# Patient Record
Sex: FEMALE | Race: WHITE | NOT HISPANIC OR LATINO | Employment: UNEMPLOYED | URBAN - METROPOLITAN AREA
[De-identification: names, ages, dates, MRNs, and addresses within clinical notes are randomized per-mention and may not be internally consistent; named-entity substitution may affect disease eponyms.]

---

## 2020-10-29 ENCOUNTER — HOSPITAL ENCOUNTER (INPATIENT)
Facility: HOSPITAL | Age: 30
LOS: 2 days | Discharge: HOME | DRG: 442 | End: 2020-10-31
Attending: EMERGENCY MEDICINE | Admitting: INTERNAL MEDICINE
Payer: MEDICAID

## 2020-10-29 DIAGNOSIS — R10.84 GENERALIZED ABDOMINAL PAIN: ICD-10-CM

## 2020-10-29 DIAGNOSIS — F19.10 POLYSUBSTANCE ABUSE: ICD-10-CM

## 2020-10-29 DIAGNOSIS — R17 JAUNDICE: ICD-10-CM

## 2020-10-29 DIAGNOSIS — B17.9 ACUTE HEPATITIS: Primary | ICD-10-CM

## 2020-10-29 PROBLEM — F17.200 SMOKER: Status: ACTIVE | Noted: 2020-10-29

## 2020-10-29 PROBLEM — R74.8 ELEVATED LIVER ENZYMES: Status: ACTIVE | Noted: 2020-10-29

## 2020-10-29 LAB
ALBUMIN SERPL BCP-MCNC: 2.8 G/DL (ref 3.5–5.2)
ALP SERPL-CCNC: 172 U/L (ref 55–135)
ALT SERPL W/O P-5'-P-CCNC: 1974 U/L (ref 10–44)
AMPHET+METHAMPHET UR QL: NORMAL
AMYLASE SERPL-CCNC: 47 U/L (ref 20–110)
ANION GAP SERPL CALC-SCNC: 5 MMOL/L (ref 8–16)
APAP SERPL-MCNC: <3 UG/ML (ref 10–20)
APTT BLDCRRT: 40.6 SEC (ref 21–32)
AST SERPL-CCNC: 2965 U/L (ref 10–40)
B-HCG UR QL: NEGATIVE
BARBITURATES UR QL SCN>200 NG/ML: NEGATIVE
BASOPHILS # BLD AUTO: 0.01 K/UL (ref 0–0.2)
BASOPHILS NFR BLD: 0.2 % (ref 0–1.9)
BENZODIAZ UR QL SCN>200 NG/ML: NEGATIVE
BILIRUB SERPL-MCNC: 7.6 MG/DL (ref 0.1–1)
BILIRUB UR QL STRIP: ABNORMAL
BUN SERPL-MCNC: 12 MG/DL (ref 6–20)
BZE UR QL SCN: NEGATIVE
CALCIUM SERPL-MCNC: 7.7 MG/DL (ref 8.7–10.5)
CANNABINOIDS UR QL SCN: NORMAL
CHLORIDE SERPL-SCNC: 106 MMOL/L (ref 95–110)
CLARITY UR: CLEAR
CO2 SERPL-SCNC: 22 MMOL/L (ref 23–29)
COLOR UR: YELLOW
CREAT SERPL-MCNC: 0.7 MG/DL (ref 0.5–1.4)
CREAT UR-MCNC: 171.3 MG/DL (ref 15–325)
DIFFERENTIAL METHOD: ABNORMAL
EOSINOPHIL # BLD AUTO: 0 K/UL (ref 0–0.5)
EOSINOPHIL NFR BLD: 0.2 % (ref 0–8)
ERYTHROCYTE [DISTWIDTH] IN BLOOD BY AUTOMATED COUNT: 17.7 % (ref 11.5–14.5)
EST. GFR  (AFRICAN AMERICAN): >60 ML/MIN/1.73 M^2
EST. GFR  (NON AFRICAN AMERICAN): >60 ML/MIN/1.73 M^2
GLUCOSE SERPL-MCNC: 91 MG/DL (ref 70–110)
GLUCOSE UR QL STRIP: NEGATIVE
HCT VFR BLD AUTO: 38 % (ref 37–48.5)
HGB BLD-MCNC: 11.9 G/DL (ref 12–16)
HGB UR QL STRIP: NEGATIVE
HIV 1+2 AB+HIV1 P24 AG SERPL QL IA: NEGATIVE
IMM GRANULOCYTES # BLD AUTO: 0.03 K/UL (ref 0–0.04)
IMM GRANULOCYTES NFR BLD AUTO: 0.6 % (ref 0–0.5)
INR PPP: 1.4 (ref 0.8–1.2)
INR PPP: 1.4 (ref 0.8–1.2)
KETONES UR QL STRIP: NEGATIVE
LEUKOCYTE ESTERASE UR QL STRIP: ABNORMAL
LIPASE SERPL-CCNC: 38 U/L (ref 4–60)
LYMPHOCYTES # BLD AUTO: 1.7 K/UL (ref 1–4.8)
LYMPHOCYTES NFR BLD: 31.5 % (ref 18–48)
MCH RBC QN AUTO: 24.7 PG (ref 27–31)
MCHC RBC AUTO-ENTMCNC: 31.3 G/DL (ref 32–36)
MCV RBC AUTO: 79 FL (ref 82–98)
METHADONE UR QL SCN>300 NG/ML: NEGATIVE
MICROSCOPIC COMMENT: ABNORMAL
MONOCYTES # BLD AUTO: 0.6 K/UL (ref 0.3–1)
MONOCYTES NFR BLD: 10.4 % (ref 4–15)
NEUTROPHILS # BLD AUTO: 3 K/UL (ref 1.8–7.7)
NEUTROPHILS NFR BLD: 57.1 % (ref 38–73)
NITRITE UR QL STRIP: NEGATIVE
NRBC BLD-RTO: 0 /100 WBC
OPIATES UR QL SCN: NEGATIVE
PCP UR QL SCN>25 NG/ML: NEGATIVE
PH UR STRIP: 6 [PH] (ref 5–8)
PLATELET # BLD AUTO: 174 K/UL (ref 150–350)
PLATELET BLD QL SMEAR: ABNORMAL
PMV BLD AUTO: ABNORMAL FL (ref 9.2–12.9)
POTASSIUM SERPL-SCNC: 4 MMOL/L (ref 3.5–5.1)
PROT SERPL-MCNC: 6.4 G/DL (ref 6–8.4)
PROT UR QL STRIP: ABNORMAL
PROTHROMBIN TIME: 15 SEC (ref 9–12.5)
PROTHROMBIN TIME: 15.1 SEC (ref 9–12.5)
RBC # BLD AUTO: 4.82 M/UL (ref 4–5.4)
RBC #/AREA URNS HPF: 11 /HPF (ref 0–4)
SARS-COV-2 RDRP RESP QL NAA+PROBE: NEGATIVE
SODIUM SERPL-SCNC: 133 MMOL/L (ref 136–145)
SP GR UR STRIP: >=1.03 (ref 1–1.03)
TOXICOLOGY INFORMATION: NORMAL
URN SPEC COLLECT METH UR: ABNORMAL
UROBILINOGEN UR STRIP-ACNC: NEGATIVE EU/DL
WBC # BLD AUTO: 5.27 K/UL (ref 3.9–12.7)
WBC #/AREA URNS HPF: 7 /HPF (ref 0–5)

## 2020-10-29 PROCEDURE — 63600175 PHARM REV CODE 636 W HCPCS: Performed by: REGISTERED NURSE

## 2020-10-29 PROCEDURE — 36415 COLL VENOUS BLD VENIPUNCTURE: CPT

## 2020-10-29 PROCEDURE — 87040 BLOOD CULTURE FOR BACTERIA: CPT | Mod: 59

## 2020-10-29 PROCEDURE — 25500020 PHARM REV CODE 255: Performed by: EMERGENCY MEDICINE

## 2020-10-29 PROCEDURE — G0378 HOSPITAL OBSERVATION PER HR: HCPCS

## 2020-10-29 PROCEDURE — 85610 PROTHROMBIN TIME: CPT

## 2020-10-29 PROCEDURE — 83690 ASSAY OF LIPASE: CPT

## 2020-10-29 PROCEDURE — 80053 COMPREHEN METABOLIC PANEL: CPT

## 2020-10-29 PROCEDURE — 96374 THER/PROPH/DIAG INJ IV PUSH: CPT

## 2020-10-29 PROCEDURE — 25000003 PHARM REV CODE 250: Performed by: REGISTERED NURSE

## 2020-10-29 PROCEDURE — 80329 ANALGESICS NON-OPIOID 1 OR 2: CPT

## 2020-10-29 PROCEDURE — 80307 DRUG TEST PRSMV CHEM ANLYZR: CPT

## 2020-10-29 PROCEDURE — 11000001 HC ACUTE MED/SURG PRIVATE ROOM

## 2020-10-29 PROCEDURE — 85610 PROTHROMBIN TIME: CPT | Mod: 91

## 2020-10-29 PROCEDURE — U0002 COVID-19 LAB TEST NON-CDC: HCPCS

## 2020-10-29 PROCEDURE — 99285 EMERGENCY DEPT VISIT HI MDM: CPT | Mod: 25

## 2020-10-29 PROCEDURE — 82150 ASSAY OF AMYLASE: CPT

## 2020-10-29 PROCEDURE — 81000 URINALYSIS NONAUTO W/SCOPE: CPT | Mod: 59

## 2020-10-29 PROCEDURE — 96361 HYDRATE IV INFUSION ADD-ON: CPT

## 2020-10-29 PROCEDURE — 85730 THROMBOPLASTIN TIME PARTIAL: CPT

## 2020-10-29 PROCEDURE — 85025 COMPLETE CBC W/AUTO DIFF WBC: CPT

## 2020-10-29 PROCEDURE — 81025 URINE PREGNANCY TEST: CPT

## 2020-10-29 PROCEDURE — 80074 ACUTE HEPATITIS PANEL: CPT

## 2020-10-29 PROCEDURE — 86703 HIV-1/HIV-2 1 RESULT ANTBDY: CPT

## 2020-10-29 RX ORDER — ONDANSETRON 2 MG/ML
4 INJECTION INTRAMUSCULAR; INTRAVENOUS EVERY 6 HOURS PRN
Status: DISCONTINUED | OUTPATIENT
Start: 2020-10-29 | End: 2020-10-31 | Stop reason: HOSPADM

## 2020-10-29 RX ORDER — ACETAMINOPHEN 325 MG/1
650 TABLET ORAL EVERY 6 HOURS PRN
Status: DISCONTINUED | OUTPATIENT
Start: 2020-10-29 | End: 2020-10-30

## 2020-10-29 RX ORDER — ONDANSETRON 2 MG/ML
4 INJECTION INTRAMUSCULAR; INTRAVENOUS
Status: COMPLETED | OUTPATIENT
Start: 2020-10-29 | End: 2020-10-29

## 2020-10-29 RX ADMIN — ONDANSETRON 4 MG: 2 INJECTION INTRAMUSCULAR; INTRAVENOUS at 01:10

## 2020-10-29 RX ADMIN — SODIUM CHLORIDE 1000 ML: 0.9 INJECTION, SOLUTION INTRAVENOUS at 01:10

## 2020-10-29 RX ADMIN — IOHEXOL 75 ML: 350 INJECTION, SOLUTION INTRAVENOUS at 04:10

## 2020-10-29 NOTE — PLAN OF CARE
Pt is resting in bed, remains free of falls and injuries. Pt refused BLANCA hose and SCDs. VSS. No obvious s/sx of distress noted. POC reviewed with the patient, verbalized understanding. Will continue to monitor.

## 2020-10-29 NOTE — SUBJECTIVE & OBJECTIVE
"History reviewed. No pertinent past medical history.    History reviewed. No pertinent surgical history.    Review of patient's allergies indicates:  No Known Allergies    No current facility-administered medications on file prior to encounter.      No current outpatient medications on file prior to encounter.     Family History     Problem Relation (Age of Onset)    Cancer Maternal Grandmother, Maternal Grandfather    Diabetes Mother, Father        Tobacco Use    Smoking status: Current Every Day Smoker     Packs/day: 0.50     Years: 5.00     Pack years: 2.50     Types: Cigarettes    Smokeless tobacco: Current User   Substance and Sexual Activity    Alcohol use: Not Currently    Drug use: Yes     Types: IV, Methamphetamines     Comment: Reports crystal meth use today    Sexual activity: Yes     Review of Systems   Constitutional: Negative for chills, diaphoresis and fever.   HENT: Negative for congestion, hearing loss, nosebleeds and postnasal drip.    Eyes: Negative for photophobia, pain, redness and visual disturbance.   Respiratory: Negative for apnea, choking and chest tightness.    Cardiovascular: Negative for chest pain, palpitations and leg swelling.   Gastrointestinal: Positive for abdominal pain. Negative for blood in stool, constipation, diarrhea, nausea and vomiting.        Reports "Oily stools"   Genitourinary: Negative for flank pain.   Musculoskeletal: Negative for back pain, gait problem, joint swelling, neck pain and neck stiffness.   Skin: Negative for pallor, rash and wound.   Neurological: Negative for dizziness, tremors, syncope, facial asymmetry and headaches.   Hematological: Negative for adenopathy.   Psychiatric/Behavioral: Negative for agitation, behavioral problems and confusion.     Objective:     Vital Signs (Most Recent):  Temp: 97.3 °F (36.3 °C) (10/29/20 1226)  Pulse: 82 (10/29/20 1620)  Resp: 18 (10/29/20 1620)  BP: 113/71 (10/29/20 1620)  SpO2: 100 % (10/29/20 1620) Vital Signs " (24h Range):  Temp:  [97.3 °F (36.3 °C)] 97.3 °F (36.3 °C)  Pulse:  [] 82  Resp:  [16-18] 18  SpO2:  [100 %] 100 %  BP: (113-134)/(69-87) 113/71     Weight: 67.3 kg (148 lb 4.2 oz)  Body mass index is 27.12 kg/m².    Physical Exam  Constitutional:       Appearance: Normal appearance.   HENT:      Head: Normocephalic and atraumatic.      Nose: No congestion or rhinorrhea.      Mouth/Throat:      Pharynx: No posterior oropharyngeal erythema.   Eyes:      General: Scleral icterus present.      Extraocular Movements: Extraocular movements intact.      Pupils: Pupils are equal, round, and reactive to light.   Neck:      Musculoskeletal: Normal range of motion and neck supple. No neck rigidity.      Vascular: No carotid bruit.   Cardiovascular:      Rate and Rhythm: Normal rate and regular rhythm.   Pulmonary:      Effort: Pulmonary effort is normal. No respiratory distress.      Breath sounds: Normal breath sounds. No stridor. No wheezing.   Abdominal:      General: There is no distension.      Palpations: Abdomen is soft.      Tenderness: There is abdominal tenderness. There is no guarding.   Musculoskeletal: Normal range of motion.         General: No swelling or deformity.   Skin:     General: Skin is warm and dry.      Capillary Refill: Capillary refill takes less than 2 seconds.      Coloration: Skin is not jaundiced.      Findings: No erythema or rash.   Neurological:      Mental Status: She is alert and oriented to person, place, and time.      Motor: No weakness.   Psychiatric:         Mood and Affect: Mood normal.         Behavior: Behavior normal.         Thought Content: Thought content normal.         Judgment: Judgment normal.           CRANIAL NERVES     CN III, IV, VI   Pupils are equal, round, and reactive to light.       Significant Labs: All pertinent labs within the past 24 hours have been reviewed.    Significant Imaging: I have reviewed all pertinent imaging results/findings within the past 24  hours.

## 2020-10-29 NOTE — ED PROVIDER NOTES
"10/29/2020, 12:27 PM   History obtained from the patient      History of Present Illness: Milagros Swan is a 30 y.o. female patient who presents to the Emergency Department for RUQ abd pain and jaundice which onset gradually 3 days ago.    12:27 PM: Pt was placed back in [facility]. I explained to pt that due to lack of available rooms pt was seen by myself to begin the workup. Pt understands they will be seen and dispositioned by the next available physician. Pt voices understanding and agrees with plan. Pt also understands the longer than normal wait time. I am removing myself from the care of pt and pt will now be assigned to the next available physician.     Jeramy Reeves Jr., [facility]    SCRIBE #1 NOTE: I, Lizbet Larsen, am scribing for, and in the presence of, Scout Penaloza Jr., MD. I have scribed the entire note.       History     Chief Complaint   Patient presents with    Abdominal Pain     x2-3 days, RUQ pain, "oily stools", hx of IVDU     Review of patient's allergies indicates:  No Known Allergies      History of Present Illness     HPI    10/29/2020, 1:21 PM  History obtained from the patient      History of Present Illness: Milagros Swan is a 30 y.o. female patient with a h/o IV drug use who presents to the Emergency Department for evaluation of RUQ abd pain x 2-3 days. Symptoms are constant and moderate in severity. Pt reports that her abd pain is exacerbated with movement. Associated sxs include yellow eyes, nausea, and "oily stools". Patient denies any fever, chills, emesis, diarrhea, SOB, CP, dysuria, hematuria, light-headedness, dizziness, and all other sxs at this time. No prior Tx reported. No further complaints or concerns at this time.       Arrival mode: Personal transportation    PCP: Primary Doctor No      Past Medical History:  History reviewed. No pertinent past medical history.       Past Surgical History:    section    Tonsillectomy    Adenoidectomy      "     Family History:  History reviewed. No pertinent family history.      Social History:   Social History     Tobacco Use    Smoking status: Current everyday smoker  Comment: 0.5 packs/day   Substance and Sexual Activity    Alcohol use: No    Drug use: Yes    Sexual activity: Unknown        Review of Systems     Review of Systems   Constitutional: Negative for chills and fever.   HENT: Negative for congestion and sore throat.    Eyes:        (+) yellow eyes   Respiratory: Negative for shortness of breath.    Cardiovascular: Negative for chest pain.   Gastrointestinal: Positive for abdominal pain (RUQ) and nausea. Negative for diarrhea and vomiting.        (+) oily stools    Genitourinary: Negative for dysuria and hematuria.   Musculoskeletal: Negative for back pain.   Skin: Negative for rash.   Neurological: Negative for dizziness, weakness, light-headedness and headaches.   Hematological: Does not bruise/bleed easily.   All other systems reviewed and are negative.       Physical Exam     Initial Vitals   BP Pulse Resp Temp SpO2   10/29/20 1226 10/29/20 1226 10/29/20 1226 10/29/20 1226 10/29/20 1335   117/69 103 16 97.3 °F (36.3 °C) 100 %      MAP       --                 Physical Exam  Nursing Notes and Vital Signs Reviewed.  Constitutional: Well-developed and well-nourished.   Head: Atraumatic. Normocephalic.  Eyes: EOM intact. Scleral icterus.  ENT: Mucous membranes are moist. Oropharynx is clear and symmetric.    Neck: Supple. Full ROM. No lymphadenopathy.  Cardiovascular: Regular rate. Regular rhythm. No murmurs, rubs, or gallops. Distal pulses are 2+ and symmetric.  Pulmonary/Chest: No respiratory distress. Clear to auscultation bilaterally. No wheezing or rales.  Abdominal: Soft and non-distended. (+) Gutiérrez's sign. There is no tenderness.   Genitourinary: No CVA tenderness  Musculoskeletal: Moves all extremities. No obvious deformities. No calf tenderness.  Skin: Jaundice.  Neurological:  Alert, awake,  "and appropriate.  Normal speech.  No acute focal neurological deficits are appreciated.  Psychiatric: Normal affect. Good eye contact. Appropriate in content.     ED Course   Procedures  ED Vital Signs:  Vitals:    10/29/20 1226 10/29/20 1335 10/29/20 1450   BP: 117/69 134/87 123/72   Pulse: 103 75 71   Resp: 16 18 18   Temp: 97.3 °F (36.3 °C)     TempSrc: Tympanic     SpO2:  100% 100%   Weight: 67.3 kg (148 lb 4.2 oz)     Height: 5' 2" (1.575 m)         Abnormal Lab Results:  Labs Reviewed   CBC W/ AUTO DIFFERENTIAL - Abnormal; Notable for the following components:       Result Value    Hemoglobin 11.9 (*)     MCV 79 (*)     MCH 24.7 (*)     MCHC 31.3 (*)     RDW 17.7 (*)     All other components within normal limits   COMPREHENSIVE METABOLIC PANEL - Abnormal; Notable for the following components:    Sodium 133 (*)     CO2 22 (*)     Calcium 7.7 (*)     Albumin 2.8 (*)     Total Bilirubin 7.6 (*)     Alkaline Phosphatase 172 (*)     AST 2,965 (*)     ALT 1,974 (*)     Anion Gap 5 (*)     All other components within normal limits   PROTIME-INR - Abnormal; Notable for the following components:    Prothrombin Time 15.1 (*)     INR 1.4 (*)     All other components within normal limits   AMYLASE   LIPASE   PREGNANCY TEST, URINE RAPID    Narrative:     Specimen Source->Urine   DRUG SCREEN PANEL, URINE EMERGENCY    Narrative:     Specimen Source->Urine   HIV 1 / 2 ANTIBODY   SARS-COV-2 RNA AMPLIFICATION, QUAL   ACETAMINOPHEN LEVEL   APTT   HEPATITIS PANEL, ACUTE   URINALYSIS, REFLEX TO URINE CULTURE   HEPATITIS C ANTIBODY        All Lab Results:  Results for orders placed or performed during the hospital encounter of 10/29/20   CBC auto differential   Result Value Ref Range    WBC 5.27 3.90 - 12.70 K/uL    RBC 4.82 4.00 - 5.40 M/uL    Hemoglobin 11.9 (L) 12.0 - 16.0 g/dL    Hematocrit 38.0 37.0 - 48.5 %    MCV 79 (L) 82 - 98 fL    MCH 24.7 (L) 27.0 - 31.0 pg    MCHC 31.3 (L) 32.0 - 36.0 g/dL    RDW 17.7 (H) 11.5 - 14.5 % "    Platelets 174 150 - 350 K/uL    MPV SEE COMMENT 9.2 - 12.9 fL   Comprehensive metabolic panel   Result Value Ref Range    Sodium 133 (L) 136 - 145 mmol/L    Potassium 4.0 3.5 - 5.1 mmol/L    Chloride 106 95 - 110 mmol/L    CO2 22 (L) 23 - 29 mmol/L    Glucose 91 70 - 110 mg/dL    BUN 12 6 - 20 mg/dL    Creatinine 0.7 0.5 - 1.4 mg/dL    Calcium 7.7 (L) 8.7 - 10.5 mg/dL    Total Protein 6.4 6.0 - 8.4 g/dL    Albumin 2.8 (L) 3.5 - 5.2 g/dL    Total Bilirubin 7.6 (H) 0.1 - 1.0 mg/dL    Alkaline Phosphatase 172 (H) 55 - 135 U/L    AST 2,965 (H) 10 - 40 U/L    ALT 1,974 (H) 10 - 44 U/L    Anion Gap 5 (L) 8 - 16 mmol/L    eGFR if African American >60 >60 mL/min/1.73 m^2    eGFR if non African American >60 >60 mL/min/1.73 m^2   Protime-INR   Result Value Ref Range    Prothrombin Time 15.1 (H) 9.0 - 12.5 sec    INR 1.4 (H) 0.8 - 1.2   Amylase   Result Value Ref Range    Amylase 47 20 - 110 U/L   Lipase   Result Value Ref Range    Lipase 38 4 - 60 U/L   Rapid Pregnancy, Urine   Result Value Ref Range    Preg Test, Ur Negative    Drug screen panel, emergency   Result Value Ref Range    Benzodiazepines Negative     Methadone metabolites Negative     Cocaine (Metab.) Negative     Opiate Scrn, Ur Negative     Barbiturate Screen, Ur Negative     Amphetamine Screen, Ur Presumptive Positive     THC Presumptive Positive     Phencyclidine Negative     Creatinine, Urine 171.3 15.0 - 325.0 mg/dL    Toxicology Information SEE COMMENT    HIV 1/2 Ag/Ab (4th Gen)   Result Value Ref Range    HIV 1/2 Ag/Ab Negative Negative   COVID-19 Rapid Screening   Result Value Ref Range    SARS-CoV-2 RNA, Amplification, Qual Negative Negative         Imaging Results          US Abdomen Limited (Gallbladder) (Final result)  Result time 10/29/20 14:14:31    Final result by Mychal Montoya MD (10/29/20 14:14:31)                 Impression:      Markedly abnormal gallbladder.  There is diffuse, non-specific, significant gallbladder wall thickening which  has a laminated edematous appearance.  No gallstones present.  Negative sonographic Gutiérrez's sign (patient complains of diffuse pain).    Marked splenomegaly.  Liver normal size.  Enlarged rose hepatis lymph node which can be seen underlying liver disease.  This could also explain the markedly thickened gallbladder wall which can be seen with underlying liver disease.    Given constellation of findings, consider further evaluation with contrast-enhanced CT..      Electronically signed by: Mychal Montoya MD  Date:    10/29/2020  Time:    14:14             Narrative:    EXAMINATION:  US ABDOMEN LIMITED    CLINICAL HISTORY:  Unspecified jaundice    TECHNIQUE:  Limited ultrasound of the right upper quadrant of the abdomen (including pancreas, liver, gallbladder, common bile duct, and right kidney) was performed.    COMPARISON:  None.    FINDINGS:  Liver: The liver is normal size and demonstrates normal echogenicity.    Gallbladder: Markedly abnormal appearing gallbladder.  There is marked diffuse gallbladder wall thickening which has a laminated edematous appearance and demonstrates a maximum thickness of approximately 14 mm.  No gallstones are evident.  Negative sonographic Gutiérrez's sign.    Biliary system: The common duct is not dilated, measuring 4 mm.  No intrahepatic ductal dilatation.    Pancreas: The visualized portions of pancreas appear normal.    Right kidney: Normal in size with no hydronephrosis and measures 12 cm.    Miscellaneous: Spleen is enlarged a 17 cm maximum length.    There is an enlarged lymph node noted at the rose hepatis measuring approximately 1.6 x 1.4 cm                                   The Emergency Provider reviewed the vital signs and test results, which are outlined above.     ED Discussion     3:03 PM: Discussed pt's case with Dr. Rubio (General Surgery) over the phone who states that this is not a surgical issue and recommends consultation with GI for acute hepatitis panel.    3:17  PM: Discussed pt's case with Dr. Salcedo (Gastroenterology) who recommends admitting pt to .    3:51 PM: Discussed case with Mireille Renteria NP (Hospital Medicine). Dr. Herrera agrees with current care and management of pt and accepts admission.   Admitting Service: Hospital Medicine  Admit to: observation non-monitored med bed     Re-evaluated pt. I have discussed test results, shared treatment plan, and the need for admission with patient and family at bedside. Pt and family express understanding at this time and agree with all information. All questions answered. Pt and family have no further questions or concerns at this time. Pt is ready for admit.    3:55 PM  Patient is stable nontoxic.  The patient has acute hepatitis of unknown cause.  Discussed case with general surgery recommended GI consult is unlikely acalculous cholecystitis.  Patient being admitted to Hospital Medicine for GI eval.  Patient is aware.  She is NPO at this time     MDM        Medical Decision Making:   Clinical Tests:   Lab Tests: Ordered and Reviewed  Radiological Study: Ordered and Reviewed     Additional MDM:   Smoking Cessation: The patient is a smoker. The patient was counseled on smoking cessation for: 3 minutes. The patient was counseled on tobacco related  health complications.        ED Medication(s):  Medications   sodium chloride 0.9% bolus 1,000 mL (0 mLs Intravenous Stopped 10/29/20 1433)   ondansetron injection 4 mg (4 mg Intravenous Given 10/29/20 1343)       New Prescriptions    No medications on file               Scribe Attestation:   Scribe #1: I performed the above scribed service and the documentation accurately describes the services I performed. I attest to the accuracy of the note.     Attending:   Physician Attestation Statement for Scribe #1: I, Scout Penaloza Jr., MD, personally performed the services described in this documentation, as scribed by Lizbet Larsen, in my presence, and it is both accurate  and complete.           Clinical Impression       ICD-10-CM ICD-9-CM   1. Acute hepatitis  B17.9 570   2. Jaundice  R17 782.4   3. Polysubstance abuse  F19.10 305.90       Disposition:   Disposition: Placed in Observation  Condition: Trina Penaloza Jr., MD  10/29/20 5610

## 2020-10-29 NOTE — HPI
"Ms Swan is a 30 y.o. female patient with a h/o IV drug use who presents to the ED endorsing RUQ abd pain x 2-3 days. Reports constant pain and rates it 6/10 which is exacerbated by movement.  She reports "oily stools" and nausea. She also reports using crystal meth earlier today. States she has never prepared the needles herself and is unsure if there are multiple users involved. Patient denies any fever, chills, emesis, diarrhea, SOB, CP, dysuria, hematuria, light-headedness, dizziness. She will be admitted to [facility] for management.     ED workup: aPTT 40.6, Serum Tylenol <3.0, U/A SPG >1.030, Sodium 133, Ca 7.7, Albumin 2.8, Bilirubin 7.6, Alkaline Phosphatase 172, AST 2965, ALT 1974, Amylase 47, Lipase 38, PT 15.1, INR 1.4  She is a [employer]. Her SDM is her mother Anisha Brower, [phone]    "

## 2020-10-29 NOTE — HOSPITAL COURSE
10/29/2020: Presents to ED endorsing RUQ pain for 2-3 days. Admitted to  OBS for management  10/30/2020: Abdominal U/s shows performed. Abdominal CT raised suspicion for gastroesophageal perforation, subsequently a repeat non contrast abdominal CT was recommended- negative for free air. GI also considering EGD. As of 10/31 EGD showed gastritis, biopsies were taken and sent for pathology. Liver enzymes trending down slowing. INR 1.4. Case discussed with keshav Kingsley to discharge patient from GI standpoint. Social work consult for [facility] referral for hepatology. Patient to follow-up with PCP in 3 days for hospital follow-up of acute hepatitis A. Patient seen and examined on the date of discharge and found suitable for discharge.

## 2020-10-29 NOTE — ASSESSMENT & PLAN NOTE
-Admit to  OBS  -ALT/AST >1000  -Daily CMPs  -GI consulted  -Abdominal ultrasound showed abnormal gallbladder, diffuse, non-specific, significant gallbladder wall thickening, no gallstones present, splenomegaly, liver normal size, and enlarged rose hepatis lymph node.  -Abdominal CT results pending  -NPO  -V/s q4h   Telephone Encounter by Jhonathan Hsu MD at 12/26/17 04:57 PM     Author:  Jhonathan Hsu MD Service:  (none) Author Type:  Physician     Filed:  12/26/17 04:59 PM Encounter Date:  12/26/2017 Status:  Signed     :  Jhonathan Hsu MD (Physician)            Amandeep West 4[TK1.1M] , done[TK1.2M]       Revision History        User Key Date/Time User Provider Type Action    > TK1.2 12/26/17 04:59 PM Jhonathan Hsu MD Physician Sign     TK1.1 12/26/17 04:57 PM Jhonathan Hsu MD Physician     M - Manual

## 2020-10-29 NOTE — H&P
"[facility] Hospital Medicine  History & Physical    Patient Name: Milagros Swan  MRN: [id number]  Admission Date: 10/29/2020  Attending Physician: Felix Morgan, *   Primary Care Provider: Primary Doctor No         Patient information was obtained from patient and ER records.     Subjective:     Principal Problem:Acute hepatitis    Chief Complaint:   Chief Complaint   Patient presents with    Abdominal Pain     x2-3 days, RUQ pain, "oily stools", hx of IVDU        HPI: Ms Swan is a 30 y.o. female patient with a h/o IV drug use who presents to the ED endorsing RUQ abd pain x 2-3 days. Reports constant pain and rates it 6/10 which is exacerbated by movement.  She reports "oily stools" and nausea. She also reports using crystal meth earlier today. States she has never prepared the needles herself and is unsure if there are multiple users involved. Patient denies any fever, chills, emesis, diarrhea, SOB, CP, dysuria, hematuria, light-headedness, dizziness. She will be admitted to [facility] for management.     ED workup: aPTT 40.6, Serum Tylenol <3.0, U/A SPG >1.030, Sodium 133, Ca 7.7, Albumin 2.8, Bilirubin 7.6, Alkaline Phosphatase 172, AST 2965, ALT 1974, Amylase 47, Lipase 38, PT 15.1, INR 1.4  She is a [employer]. Her SDM is her mother Anisha Brower, [phone]      History reviewed. No pertinent past medical history.    History reviewed. No pertinent surgical history.    Review of patient's allergies indicates:  No Known Allergies    No current facility-administered medications on file prior to encounter.      No current outpatient medications on file prior to encounter.     Family History     Problem Relation (Age of Onset)    Cancer Maternal Grandmother, Maternal Grandfather    Diabetes Mother, Father        Tobacco Use    Smoking status: Current Every Day Smoker     Packs/day: 0.50     Years: 5.00     Pack years: 2.50     Types: Cigarettes    Smokeless tobacco: Current User " "  Substance and Sexual Activity    Alcohol use: Not Currently    Drug use: Yes     Types: IV, Methamphetamines     Comment: Reports crystal meth use today    Sexual activity: Yes     Review of Systems   Constitutional: Negative for chills, diaphoresis and fever.   HENT: Negative for congestion, hearing loss, nosebleeds and postnasal drip.    Eyes: Negative for photophobia, pain, redness and visual disturbance.   Respiratory: Negative for apnea, choking and chest tightness.    Cardiovascular: Negative for chest pain, palpitations and leg swelling.   Gastrointestinal: Positive for abdominal pain. Negative for blood in stool, constipation, diarrhea, nausea and vomiting.        Reports "Oily stools"   Genitourinary: Negative for flank pain.   Musculoskeletal: Negative for back pain, gait problem, joint swelling, neck pain and neck stiffness.   Skin: Negative for pallor, rash and wound.   Neurological: Negative for dizziness, tremors, syncope, facial asymmetry and headaches.   Hematological: Negative for adenopathy.   Psychiatric/Behavioral: Negative for agitation, behavioral problems and confusion.     Objective:     Vital Signs (Most Recent):  Temp: 97.3 °F (36.3 °C) (10/29/20 1226)  Pulse: 82 (10/29/20 1620)  Resp: 18 (10/29/20 1620)  BP: 113/71 (10/29/20 1620)  SpO2: 100 % (10/29/20 1620) Vital Signs (24h Range):  Temp:  [97.3 °F (36.3 °C)] 97.3 °F (36.3 °C)  Pulse:  [] 82  Resp:  [16-18] 18  SpO2:  [100 %] 100 %  BP: (113-134)/(69-87) 113/71     Weight: 67.3 kg (148 lb 4.2 oz)  Body mass index is 27.12 kg/m².    Physical Exam  Constitutional:       Appearance: Normal appearance.   HENT:      Head: Normocephalic and atraumatic.      Nose: No congestion or rhinorrhea.      Mouth/Throat:      Pharynx: No posterior oropharyngeal erythema.   Eyes:      General: Scleral icterus present.      Extraocular Movements: Extraocular movements intact.      Pupils: Pupils are equal, round, and reactive to light.   Neck: "      Musculoskeletal: Normal range of motion and neck supple. No neck rigidity.      Vascular: No carotid bruit.   Cardiovascular:      Rate and Rhythm: Normal rate and regular rhythm.   Pulmonary:      Effort: Pulmonary effort is normal. No respiratory distress.      Breath sounds: Normal breath sounds. No stridor. No wheezing.   Abdominal:      General: There is no distension.      Palpations: Abdomen is soft.      Tenderness: There is abdominal tenderness. There is no guarding.   Musculoskeletal: Normal range of motion.         General: No swelling or deformity.   Skin:     General: Skin is warm and dry.      Capillary Refill: Capillary refill takes less than 2 seconds.      Coloration: Skin is not jaundiced.      Findings: No erythema or rash.   Neurological:      Mental Status: She is alert and oriented to person, place, and time.      Motor: No weakness.   Psychiatric:         Mood and Affect: Mood normal.         Behavior: Behavior normal.         Thought Content: Thought content normal.         Judgment: Judgment normal.           CRANIAL NERVES     CN III, IV, VI   Pupils are equal, round, and reactive to light.       Significant Labs: All pertinent labs within the past 24 hours have been reviewed.    Significant Imaging: I have reviewed all pertinent imaging results/findings within the past 24 hours.    Assessment/Plan:     * Acute hepatitis  -Admit to  OBS  -ALT/AST >1000  -Daily CMPs  -GI consulted  -Abdominal ultrasound showed abnormal gallbladder, diffuse, non-specific, significant gallbladder wall thickening, no gallstones present, splenomegaly, liver normal size, and enlarged rose hepatis lymph node.  -Abdominal CT results pending  -NPO  -V/s q4h  -Acute Hepatitis panel results pending      Smoker  -Current 0.5 pack/day smoker, smoking for 5 years  -Cessation counseling offered      IV drug abuse  -Reports crystal meth use today  -Tox screen negative      Generalized abdominal pain  -Abdominal u/s  negative for sonographic Gutiérrez's sign (patient complains of diffuse pain).      Elevated liver enzymes  See acute Hepatitis        VTE Risk Mitigation (From admission, onward)         Ordered     IP VTE LOW RISK PATIENT  Once      10/29/20 1719     Place sequential compression device  Until discontinued      10/29/20 1719     Place BLANCA hose  Until discontinued      10/29/20 1719                   Erin Sylvester NP  Department of Hospital Medicine   [facility]

## 2020-10-29 NOTE — ASSESSMENT & PLAN NOTE
-Admit to  OBS  -ALT/AST >1000  -Daily CMPs  -GI consulted  -Abdominal ultrasound showed abnormal gallbladder, diffuse, non-specific, significant gallbladder wall thickening, no gallstones present, splenomegaly, liver normal size, and enlarged rose hepatis lymph node.  -Abdominal CT results pending  -NPO  -V/s q4h

## 2020-10-30 ENCOUNTER — ANESTHESIA EVENT (OUTPATIENT)
Dept: ENDOSCOPY | Facility: HOSPITAL | Age: 30
DRG: 442 | End: 2020-10-30
Payer: MEDICAID

## 2020-10-30 ENCOUNTER — ANESTHESIA (OUTPATIENT)
Dept: ENDOSCOPY | Facility: HOSPITAL | Age: 30
DRG: 442 | End: 2020-10-30
Payer: MEDICAID

## 2020-10-30 LAB
ALBUMIN SERPL BCP-MCNC: 2.4 G/DL (ref 3.5–5.2)
ALBUMIN SERPL BCP-MCNC: 2.4 G/DL (ref 3.5–5.2)
ALBUMIN SERPL BCP-MCNC: 2.5 G/DL (ref 3.5–5.2)
ALBUMIN SERPL BCP-MCNC: 2.5 G/DL (ref 3.5–5.2)
ALP SERPL-CCNC: 149 U/L (ref 55–135)
ALP SERPL-CCNC: 154 U/L (ref 55–135)
ALP SERPL-CCNC: 155 U/L (ref 55–135)
ALP SERPL-CCNC: 161 U/L (ref 55–135)
ALT SERPL W/O P-5'-P-CCNC: 2013 U/L (ref 10–44)
ALT SERPL W/O P-5'-P-CCNC: 2033 U/L (ref 10–44)
ALT SERPL W/O P-5'-P-CCNC: 2121 U/L (ref 10–44)
ALT SERPL W/O P-5'-P-CCNC: 2184 U/L (ref 10–44)
ANION GAP SERPL CALC-SCNC: 6 MMOL/L (ref 8–16)
ANION GAP SERPL CALC-SCNC: 7 MMOL/L (ref 8–16)
AST SERPL-CCNC: 2655 U/L (ref 10–40)
AST SERPL-CCNC: 2949 U/L (ref 10–40)
AST SERPL-CCNC: 3315 U/L (ref 10–40)
AST SERPL-CCNC: 3342 U/L (ref 10–40)
BASOPHILS # BLD AUTO: 0.01 K/UL (ref 0–0.2)
BASOPHILS NFR BLD: 0.2 % (ref 0–1.9)
BILIRUB SERPL-MCNC: 8.1 MG/DL (ref 0.1–1)
BILIRUB SERPL-MCNC: 8.2 MG/DL (ref 0.1–1)
BILIRUB SERPL-MCNC: 8.4 MG/DL (ref 0.1–1)
BILIRUB SERPL-MCNC: 8.9 MG/DL (ref 0.1–1)
BUN SERPL-MCNC: 7 MG/DL (ref 6–20)
BUN SERPL-MCNC: 7 MG/DL (ref 6–20)
BUN SERPL-MCNC: 8 MG/DL (ref 6–20)
BUN SERPL-MCNC: 8 MG/DL (ref 6–20)
CALCIUM SERPL-MCNC: 7.3 MG/DL (ref 8.7–10.5)
CALCIUM SERPL-MCNC: 7.6 MG/DL (ref 8.7–10.5)
CALCIUM SERPL-MCNC: 7.7 MG/DL (ref 8.7–10.5)
CALCIUM SERPL-MCNC: 7.8 MG/DL (ref 8.7–10.5)
CHLORIDE SERPL-SCNC: 102 MMOL/L (ref 95–110)
CHLORIDE SERPL-SCNC: 105 MMOL/L (ref 95–110)
CHLORIDE SERPL-SCNC: 105 MMOL/L (ref 95–110)
CHLORIDE SERPL-SCNC: 106 MMOL/L (ref 95–110)
CO2 SERPL-SCNC: 22 MMOL/L (ref 23–29)
CO2 SERPL-SCNC: 23 MMOL/L (ref 23–29)
CO2 SERPL-SCNC: 23 MMOL/L (ref 23–29)
CO2 SERPL-SCNC: 24 MMOL/L (ref 23–29)
CREAT SERPL-MCNC: 0.6 MG/DL (ref 0.5–1.4)
CREAT SERPL-MCNC: 0.7 MG/DL (ref 0.5–1.4)
DIFFERENTIAL METHOD: ABNORMAL
EOSINOPHIL # BLD AUTO: 0 K/UL (ref 0–0.5)
EOSINOPHIL NFR BLD: 0.5 % (ref 0–8)
ERYTHROCYTE [DISTWIDTH] IN BLOOD BY AUTOMATED COUNT: 18.2 % (ref 11.5–14.5)
EST. GFR  (AFRICAN AMERICAN): >60 ML/MIN/1.73 M^2
EST. GFR  (NON AFRICAN AMERICAN): >60 ML/MIN/1.73 M^2
GLUCOSE SERPL-MCNC: 79 MG/DL (ref 70–110)
GLUCOSE SERPL-MCNC: 84 MG/DL (ref 70–110)
GLUCOSE SERPL-MCNC: 98 MG/DL (ref 70–110)
GLUCOSE SERPL-MCNC: 98 MG/DL (ref 70–110)
HAV IGM SERPL QL IA: POSITIVE
HBV CORE IGM SERPL QL IA: NEGATIVE
HBV SURFACE AG SERPL QL IA: NEGATIVE
HCT VFR BLD AUTO: 37.5 % (ref 37–48.5)
HCV AB SERPL QL IA: POSITIVE
HCV AB SERPL QL IA: POSITIVE
HGB BLD-MCNC: 11.4 G/DL (ref 12–16)
IMM GRANULOCYTES # BLD AUTO: 0.02 K/UL (ref 0–0.04)
IMM GRANULOCYTES NFR BLD AUTO: 0.5 % (ref 0–0.5)
INR PPP: 1.4 (ref 0.8–1.2)
INR PPP: 1.5 (ref 0.8–1.2)
LYMPHOCYTES # BLD AUTO: 1.4 K/UL (ref 1–4.8)
LYMPHOCYTES NFR BLD: 33.5 % (ref 18–48)
MCH RBC QN AUTO: 24.2 PG (ref 27–31)
MCHC RBC AUTO-ENTMCNC: 30.4 G/DL (ref 32–36)
MCV RBC AUTO: 80 FL (ref 82–98)
MONOCYTES # BLD AUTO: 0.4 K/UL (ref 0.3–1)
MONOCYTES NFR BLD: 10.8 % (ref 4–15)
NEUTROPHILS # BLD AUTO: 2.2 K/UL (ref 1.8–7.7)
NEUTROPHILS NFR BLD: 54.5 % (ref 38–73)
NRBC BLD-RTO: 0 /100 WBC
PLATELET # BLD AUTO: 123 K/UL (ref 150–350)
PMV BLD AUTO: ABNORMAL FL (ref 9.2–12.9)
POTASSIUM SERPL-SCNC: 3.6 MMOL/L (ref 3.5–5.1)
POTASSIUM SERPL-SCNC: 3.7 MMOL/L (ref 3.5–5.1)
POTASSIUM SERPL-SCNC: 3.7 MMOL/L (ref 3.5–5.1)
POTASSIUM SERPL-SCNC: 4.3 MMOL/L (ref 3.5–5.1)
PROT SERPL-MCNC: 5.8 G/DL (ref 6–8.4)
PROT SERPL-MCNC: 6 G/DL (ref 6–8.4)
PROTHROMBIN TIME: 14.9 SEC (ref 9–12.5)
PROTHROMBIN TIME: 15.1 SEC (ref 9–12.5)
PROTHROMBIN TIME: 15.1 SEC (ref 9–12.5)
PROTHROMBIN TIME: 15.8 SEC (ref 9–12.5)
RBC # BLD AUTO: 4.71 M/UL (ref 4–5.4)
SODIUM SERPL-SCNC: 132 MMOL/L (ref 136–145)
SODIUM SERPL-SCNC: 134 MMOL/L (ref 136–145)
SODIUM SERPL-SCNC: 134 MMOL/L (ref 136–145)
SODIUM SERPL-SCNC: 135 MMOL/L (ref 136–145)
WBC # BLD AUTO: 4.06 K/UL (ref 3.9–12.7)

## 2020-10-30 PROCEDURE — 88342 CHG IMMUNOCYTOCHEMISTRY: ICD-10-PCS | Mod: 26,,, | Performed by: PATHOLOGY

## 2020-10-30 PROCEDURE — 88305 TISSUE EXAM BY PATHOLOGIST: ICD-10-PCS | Mod: 26,,, | Performed by: PATHOLOGY

## 2020-10-30 PROCEDURE — 85610 PROTHROMBIN TIME: CPT

## 2020-10-30 PROCEDURE — 63600175 PHARM REV CODE 636 W HCPCS: Performed by: INTERNAL MEDICINE

## 2020-10-30 PROCEDURE — 88305 TISSUE EXAM BY PATHOLOGIST: CPT | Mod: 26,,, | Performed by: PATHOLOGY

## 2020-10-30 PROCEDURE — 43239 EGD BIOPSY SINGLE/MULTIPLE: CPT | Performed by: INTERNAL MEDICINE

## 2020-10-30 PROCEDURE — 88342 IMHCHEM/IMCYTCHM 1ST ANTB: CPT | Performed by: PATHOLOGY

## 2020-10-30 PROCEDURE — 43239 PR EGD, FLEX, W/BIOPSY, SGL/MULTI: ICD-10-PCS | Mod: ,,, | Performed by: INTERNAL MEDICINE

## 2020-10-30 PROCEDURE — 37000009 HC ANESTHESIA EA ADD 15 MINS: Performed by: INTERNAL MEDICINE

## 2020-10-30 PROCEDURE — 37000008 HC ANESTHESIA 1ST 15 MINUTES: Performed by: INTERNAL MEDICINE

## 2020-10-30 PROCEDURE — 88342 IMHCHEM/IMCYTCHM 1ST ANTB: CPT | Mod: 26,,, | Performed by: PATHOLOGY

## 2020-10-30 PROCEDURE — 36415 COLL VENOUS BLD VENIPUNCTURE: CPT

## 2020-10-30 PROCEDURE — 43239 EGD BIOPSY SINGLE/MULTIPLE: CPT | Mod: ,,, | Performed by: INTERNAL MEDICINE

## 2020-10-30 PROCEDURE — 88305 TISSUE EXAM BY PATHOLOGIST: CPT | Mod: 59 | Performed by: PATHOLOGY

## 2020-10-30 PROCEDURE — 80053 COMPREHEN METABOLIC PANEL: CPT | Mod: 91

## 2020-10-30 PROCEDURE — 25500020 PHARM REV CODE 255: Performed by: INTERNAL MEDICINE

## 2020-10-30 PROCEDURE — 11000001 HC ACUTE MED/SURG PRIVATE ROOM

## 2020-10-30 PROCEDURE — 96375 TX/PRO/DX INJ NEW DRUG ADDON: CPT

## 2020-10-30 PROCEDURE — 25000003 PHARM REV CODE 250: Performed by: NURSE ANESTHETIST, CERTIFIED REGISTERED

## 2020-10-30 PROCEDURE — 27201012 HC FORCEPS, HOT/COLD, DISP: Performed by: INTERNAL MEDICINE

## 2020-10-30 PROCEDURE — 63600175 PHARM REV CODE 636 W HCPCS: Performed by: NURSE ANESTHETIST, CERTIFIED REGISTERED

## 2020-10-30 PROCEDURE — 85025 COMPLETE CBC W/AUTO DIFF WBC: CPT

## 2020-10-30 PROCEDURE — 85610 PROTHROMBIN TIME: CPT | Mod: 91

## 2020-10-30 PROCEDURE — 25000003 PHARM REV CODE 250: Performed by: INTERNAL MEDICINE

## 2020-10-30 PROCEDURE — 96376 TX/PRO/DX INJ SAME DRUG ADON: CPT

## 2020-10-30 PROCEDURE — 99204 PR OFFICE/OUTPT VISIT, NEW, LEVL IV, 45-59 MIN: ICD-10-PCS | Mod: 25,,, | Performed by: INTERNAL MEDICINE

## 2020-10-30 PROCEDURE — 99204 OFFICE O/P NEW MOD 45 MIN: CPT | Mod: 25,,, | Performed by: INTERNAL MEDICINE

## 2020-10-30 PROCEDURE — 63600175 PHARM REV CODE 636 W HCPCS: Performed by: NURSE PRACTITIONER

## 2020-10-30 RX ORDER — LIDOCAINE HYDROCHLORIDE 10 MG/ML
INJECTION, SOLUTION EPIDURAL; INFILTRATION; INTRACAUDAL; PERINEURAL
Status: DISCONTINUED | OUTPATIENT
Start: 2020-10-30 | End: 2020-10-30

## 2020-10-30 RX ORDER — PROPOFOL 10 MG/ML
VIAL (ML) INTRAVENOUS
Status: DISCONTINUED | OUTPATIENT
Start: 2020-10-30 | End: 2020-10-30

## 2020-10-30 RX ORDER — HYDROCODONE BITARTRATE AND ACETAMINOPHEN 10; 325 MG/1; MG/1
1 TABLET ORAL EVERY 6 HOURS PRN
Status: DISCONTINUED | OUTPATIENT
Start: 2020-10-30 | End: 2020-10-30

## 2020-10-30 RX ORDER — SODIUM CHLORIDE, SODIUM LACTATE, POTASSIUM CHLORIDE, CALCIUM CHLORIDE 600; 310; 30; 20 MG/100ML; MG/100ML; MG/100ML; MG/100ML
INJECTION, SOLUTION INTRAVENOUS CONTINUOUS PRN
Status: DISCONTINUED | OUTPATIENT
Start: 2020-10-30 | End: 2020-10-30

## 2020-10-30 RX ORDER — PANTOPRAZOLE SODIUM 40 MG/1
40 TABLET, DELAYED RELEASE ORAL
Status: DISCONTINUED | OUTPATIENT
Start: 2020-10-30 | End: 2020-10-31 | Stop reason: HOSPADM

## 2020-10-30 RX ORDER — MIDAZOLAM HYDROCHLORIDE 1 MG/ML
INJECTION, SOLUTION INTRAMUSCULAR; INTRAVENOUS
Status: DISCONTINUED | OUTPATIENT
Start: 2020-10-30 | End: 2020-10-30

## 2020-10-30 RX ORDER — MORPHINE SULFATE 2 MG/ML
2 INJECTION, SOLUTION INTRAMUSCULAR; INTRAVENOUS EVERY 6 HOURS PRN
Status: DISCONTINUED | OUTPATIENT
Start: 2020-10-30 | End: 2020-10-31 | Stop reason: HOSPADM

## 2020-10-30 RX ADMIN — PROPOFOL 30 MG: 10 INJECTION, EMULSION INTRAVENOUS at 10:10

## 2020-10-30 RX ADMIN — PROPOFOL 20 MG: 10 INJECTION, EMULSION INTRAVENOUS at 10:10

## 2020-10-30 RX ADMIN — MORPHINE SULFATE 2 MG: 2 INJECTION, SOLUTION INTRAMUSCULAR; INTRAVENOUS at 01:10

## 2020-10-30 RX ADMIN — LIDOCAINE HYDROCHLORIDE 100 MG: 10 INJECTION, SOLUTION EPIDURAL; INFILTRATION; INTRACAUDAL; PERINEURAL at 10:10

## 2020-10-30 RX ADMIN — ONDANSETRON 4 MG: 2 INJECTION INTRAMUSCULAR; INTRAVENOUS at 02:10

## 2020-10-30 RX ADMIN — ONDANSETRON 4 MG: 2 INJECTION INTRAMUSCULAR; INTRAVENOUS at 01:10

## 2020-10-30 RX ADMIN — MIDAZOLAM HYDROCHLORIDE 2 MG: 1 INJECTION, SOLUTION INTRAMUSCULAR; INTRAVENOUS at 10:10

## 2020-10-30 RX ADMIN — SODIUM CHLORIDE, SODIUM LACTATE, POTASSIUM CHLORIDE, AND CALCIUM CHLORIDE: .6; .31; .03; .02 INJECTION, SOLUTION INTRAVENOUS at 10:10

## 2020-10-30 RX ADMIN — PANTOPRAZOLE SODIUM 40 MG: 40 TABLET, DELAYED RELEASE ORAL at 12:10

## 2020-10-30 RX ADMIN — PROPOFOL 100 MG: 10 INJECTION, EMULSION INTRAVENOUS at 10:10

## 2020-10-30 RX ADMIN — IOHEXOL 25 ML: 350 INJECTION, SOLUTION INTRAVENOUS at 09:10

## 2020-10-30 RX ADMIN — MORPHINE SULFATE 2 MG: 2 INJECTION, SOLUTION INTRAMUSCULAR; INTRAVENOUS at 08:10

## 2020-10-30 NOTE — ASSESSMENT & PLAN NOTE
Most likely drug induced injury. Acute viral hepatitis is in the differential. Panel pending.   At this time, her INR has remained stable so no evidence of failure, but will need to monitor INR and CMP closely.   No signs of encephalopathy. Continue close monitoring of neuro status.   Recommend supportive care with IV fluids, pain management and antiemetics as needed.

## 2020-10-30 NOTE — PROGRESS NOTES
"[facility] Hospital Medicine  Progress Note    Patient Name: Milagros Swan  MRN: [id number]  Patient Class: OP- Observation   Admission Date: 10/29/2020  Length of Stay: 0 days  Attending Physician: Felix Morgan, *  Primary Care Provider: Primary Doctor No        Subjective:     Principal Problem:Acute hepatitis        HPI:  Ms Swan is a 30 y.o. female patient with a h/o IV drug use who presents to the ED endorsing RUQ abd pain x 2-3 days. Reports constant pain and rates it 6/10 which is exacerbated by movement.  She reports "oily stools" and nausea. She also reports using crystal meth earlier today. States she has never prepared the needles herself and is unsure if there are multiple users involved. Patient denies any fever, chills, emesis, diarrhea, SOB, CP, dysuria, hematuria, light-headedness, dizziness. She will be admitted to [facility] for management.     ED workup: aPTT 40.6, Serum Tylenol <3.0, U/A SPG >1.030, Sodium 133, Ca 7.7, Albumin 2.8, Bilirubin 7.6, Alkaline Phosphatase 172, AST 2965, ALT 1974, Amylase 47, Lipase 38, PT 15.1, INR 1.4  She is a Full[employer]. Her SDM is her mother Anisha Brower, [phone]      Overview/Hospital Course:  10/29/2020: Presents to ED endorsing RUQ pain for 2-3 days. Admitted to [facility] for management  10/30/2020: Abdominal U/s shows performed. Abdominal CT raised suspicion for gastroesophageal perforation, subsequently a repeat non contrast abdominal CT was recommended. Results pending.     Interval History: Pt endorses RUQ pain. Hydrocodone-acetaminophen 10mg ordered. Concern for gastroesophageal perforation necessitated a repeat abdominal CT. EGD being considered. GI suspects patient may have to spend another night pending abdominal CT results.     Review of Systems   Constitutional: Negative for appetite change, chills and unexpected weight change.   HENT: Negative for congestion, mouth sores, sinus pressure, sore throat and trouble " swallowing.    Eyes: Negative for photophobia and visual disturbance.   Respiratory: Negative for cough, shortness of breath and wheezing.    Cardiovascular: Negative for chest pain, palpitations and leg swelling.   Gastrointestinal: Positive for abdominal pain (RUE ) and nausea. Negative for abdominal distention, rectal pain and vomiting.   Endocrine: Negative for polydipsia, polyphagia and polyuria.   Genitourinary: Negative for flank pain, frequency and hematuria.   Musculoskeletal: Negative for back pain and neck stiffness.   Skin: Negative for pallor and rash.   Allergic/Immunologic: Negative for immunocompromised state.   Neurological: Negative for dizziness, tremors, seizures, syncope, speech difficulty, weakness and headaches.   Hematological: Negative for adenopathy. Does not bruise/bleed easily.   Psychiatric/Behavioral: Negative for agitation, confusion and suicidal ideas.     Objective:     Vital Signs (Most Recent):  Temp: 98 °F (36.7 °C) (10/30/20 0728)  Pulse: 79 (10/30/20 0728)  Resp: 18 (10/30/20 0728)  BP: 118/75 (10/30/20 0728)  SpO2: 100 % (10/30/20 0728) Vital Signs (24h Range):  Temp:  [96.6 °F (35.9 °C)-98.3 °F (36.8 °C)] 98 °F (36.7 °C)  Pulse:  [] 79  Resp:  [16-18] 18  SpO2:  [97 %-100 %] 100 %  BP: (100-134)/(64-87) 118/75     Weight: 67.3 kg (148 lb 4.2 oz)  Body mass index is 27.12 kg/m².    Intake/Output Summary (Last 24 hours) at 10/30/2020 0901  Last data filed at 10/29/2020 1433  Gross per 24 hour   Intake 1000 ml   Output --   Net 1000 ml      Physical Exam  Constitutional:       Appearance: Normal appearance.   HENT:      Head: Normocephalic and atraumatic.      Nose: No congestion or rhinorrhea.      Mouth/Throat:      Pharynx: No posterior oropharyngeal erythema.   Eyes:      General: Scleral icterus present.      Extraocular Movements: Extraocular movements intact.      Pupils: Pupils are equal, round, and reactive to light.   Neck:      Musculoskeletal: Normal range of  motion and neck supple. No neck rigidity.      Vascular: No carotid bruit.   Cardiovascular:      Rate and Rhythm: Normal rate and regular rhythm.   Pulmonary:      Effort: Pulmonary effort is normal. No respiratory distress.      Breath sounds: Normal breath sounds. No stridor. No wheezing.   Abdominal:      General: There is no distension.      Palpations: Abdomen is soft.      Tenderness: There is abdominal tenderness (RUE). There is no guarding.   Musculoskeletal: Normal range of motion.         General: No swelling or deformity.   Skin:     General: Skin is warm and dry.      Capillary Refill: Capillary refill takes less than 2 seconds.      Coloration: Skin is not jaundiced.      Findings: No erythema or rash.   Neurological:      Mental Status: She is alert and oriented to person, place, and time.      Motor: No weakness.   Psychiatric:         Mood and Affect: Mood normal.         Behavior: Behavior normal.         Thought Content: Thought content normal.         Judgment: Judgment normal.         Significant Labs: All pertinent labs within the past 24 hours have been reviewed.    Significant Imaging: I have reviewed all pertinent imaging results/findings within the past 24 hours.      Assessment/Plan:      * Acute hepatitis  -GI following  -Acute Hepatitis panel pending  -10/30- AST 3342, ALT 2121 (trending up from 10/29)  -PT/INR remain stable      Smoker  -Current 0.5 pack/day smoker, smoking for 5 years  -Cessation counseling offered      IV drug abuse  -Blood Cx results pending      Generalized abdominal pain  -GI consulted  -Abdominal u/s negative for sonographic Gutiérrez's sign (patient complains of diffuse pain).  -Abdominal CT with contrast raises concern for gastroesophageal perforation. -CT scan showed signs of acute hepatitis, but it also mentioned gas which could indicated perforated viscus. The CT was reviewed by Dr. Brown, Dr. Rubio and Dr. Anderson. Less likely, since vitals stable.  -Repeat  abdominal CT without contrast this AM again showed dilated gallbladder with surrounding fluid (may be related to acute cholecystitis), edma around the liver, and splenomegaly.   -EGD results pending   -Hydrocodone-acetaminophen 10mg q6h PRN available.       Elevated liver enzymes  -Likely related to Acute Hepatitis        VTE Risk Mitigation (From admission, onward)         Ordered     IP VTE LOW RISK PATIENT  Once      10/29/20 1719     Place sequential compression device  Until discontinued      10/29/20 1719     Place BLANCA hose  Until discontinued      10/29/20 1719                Discharge Planning   GARCÍA:      Code Status: Full Code   Is the patient medically ready for discharge?:     Reason for patient still in hospital (select all that apply): Laboratory test, Treatment, Imaging and Pending disposition                     Erin Sylvester NP  Department of Hospital Medicine   [facility]

## 2020-10-30 NOTE — CONSULTS
[facility]   Gastroenterology  Consult Note    Patient Name: Milagros Swan  MRN: [id number]  Admission Date: 10/29/2020  Hospital Length of Stay: 0 days  Code Status: Full Code   Attending Provider: Felix Morgan, *   Consulting Provider: Efren Yip PA-C  Primary Care Physician: Primary Doctor No  Principal Problem:Acute hepatitis    Inpatient consult to Gastroenterology  Consult performed by: Efren Yip PA-C  Consult ordered by: Erin Sylvester NP  Reason for consult: Elevated liver enzymes        Subjective:     HPI:  The patient presented to the ER for RUQ pain. Pain started about five days ago. She now says it is diffuse. The pain is worse with movement. She is hungry today and thinks this is contributing to her pain. She also reported a change in stools, nausea, vomiting and jaundice. Her stools are light brown and loose. She denies hematemesis. She was noted to have elevated LFTs with AST and ALT into the thousands. T. Bili is 7.6 with INR of 1.4. ALB 2.8. Acetaminophen level was normal. The patient reported a history of IV drug use and reports using crystal meth the day of admit. Acute hepatitis panel pending. She denies alcohol use. US showed diffuse, non-specific, significant gallbladder wall thickening which has a laminated edematous appearance; no gallstones present; marked splenomegaly and enlarged rose hepatis lymph node which can be seen underlying liver disease. CT scan showed gallbladder wall thickening and pericholecystic fluid; top-normal spleen measuring up to 12 cm; moderate  periportal edema with mild ascites; focus of gas is seen along the posterior left subdiaphragmatic region between the spleen and the stomach worrisome for perforated viscus. Her vitals have been stable.     Past Medical History:   Diagnosis Date    Substance abuse        History reviewed. No pertinent surgical history.    Review of patient's allergies indicates:  No Known  Allergies  Family History     Problem Relation (Age of Onset)    Cancer Maternal Grandmother, Maternal Grandfather    Diabetes Mother, Father        Tobacco Use    Smoking status: Current Every Day Smoker     Packs/day: 0.50     Years: 5.00     Pack years: 2.50     Types: Cigarettes    Smokeless tobacco: Current User   Substance and Sexual Activity    Alcohol use: Not Currently    Drug use: Yes     Types: IV, Methamphetamines     Comment: Reports crystal meth use today    Sexual activity: Yes     Review of Systems   Constitutional: Positive for activity change. Negative for fever.   HENT: Negative for hearing loss.    Eyes: Negative for visual disturbance.   Respiratory: Positive for shortness of breath (resolved). Negative for cough.    Cardiovascular: Negative for chest pain.   Gastrointestinal:        As per HPI.   Genitourinary: Negative for dysuria, frequency and hematuria.   Musculoskeletal: Negative for arthralgias and back pain.   Skin: Negative for rash.   Neurological: Negative for seizures, syncope, numbness and headaches.   Hematological: Does not bruise/bleed easily.   Psychiatric/Behavioral: The patient is not nervous/anxious.      Objective:     Vital Signs (Most Recent):  Temp: 98 °F (36.7 °C) (10/30/20 0728)  Pulse: 79 (10/30/20 0728)  Resp: 18 (10/30/20 0728)  BP: 118/75 (10/30/20 0728)  SpO2: 100 % (10/30/20 0728) Vital Signs (24h Range):  Temp:  [96.6 °F (35.9 °C)-98.3 °F (36.8 °C)] 98 °F (36.7 °C)  Pulse:  [] 79  Resp:  [16-18] 18  SpO2:  [97 %-100 %] 100 %  BP: (100-134)/(64-87) 118/75     Weight: 67.3 kg (148 lb 4.2 oz) (10/29/20 1226)  Body mass index is 27.12 kg/m².      Intake/Output Summary (Last 24 hours) at 10/30/2020 0827  Last data filed at 10/29/2020 1433  Gross per 24 hour   Intake 1000 ml   Output --   Net 1000 ml       Lines/Drains/Airways     Peripheral Intravenous Line                 Peripheral IV - Single Lumen 10/29/20 1342 20 G Left Antecubital less than 1 day                 Physical Exam  Constitutional:       Appearance: Normal appearance. She is well-developed.   HENT:      Head: Normocephalic and atraumatic.   Eyes:      General: Scleral icterus present.      Extraocular Movements: Extraocular movements intact.   Neck:      Musculoskeletal: Normal range of motion and neck supple.      Vascular: No carotid bruit.   Cardiovascular:      Rate and Rhythm: Normal rate and regular rhythm.      Heart sounds: No murmur.   Pulmonary:      Effort: Pulmonary effort is normal. No respiratory distress.      Breath sounds: Normal breath sounds. No wheezing.   Abdominal:      General: Bowel sounds are normal. There is no distension.      Palpations: Abdomen is soft. There is no mass.      Tenderness: There is generalized abdominal tenderness (moderate). There is no rebound.   Musculoskeletal:      Right lower leg: No edema.      Left lower leg: No edema.   Skin:     General: Skin is warm and dry.      Findings: No rash.   Neurological:      Mental Status: She is alert and oriented to person, place, and time.      Cranial Nerves: No cranial nerve deficit.   Psychiatric:         Behavior: Behavior normal.         Significant Labs:  CBC:   Recent Labs   Lab 10/29/20  1342   WBC 5.27   HGB 11.9*   HCT 38.0        CMP:   Recent Labs   Lab 10/29/20  1342   GLU 91   CALCIUM 7.7*   ALBUMIN 2.8*   PROT 6.4   *   K 4.0   CO2 22*      BUN 12   CREATININE 0.7   ALKPHOS 172*   ALT 1,974*   AST 2,965*   BILITOT 7.6*     Coagulation:   Recent Labs   Lab 10/29/20  1342  10/30/20  0659   INR 1.4*   < > 1.4*   APTT 40.6*  --   --     < > = values in this interval not displayed.       Significant Imaging:  Imaging results within the past 24 hours have been reviewed.    Assessment/Plan:     * Acute hepatitis  Most likely drug induced injury. Acute viral hepatitis is in the differential. Panel pending.   At this time, her INR has remained stable so no evidence of failure, but will need  to monitor INR and CMP closely.   No signs of encephalopathy. Continue close monitoring of neuro status.   Recommend supportive care with IV fluids, pain management and antiemetics as needed.     Generalized abdominal pain  CT scan showed signs of acute hepatitis, but it also mentioned gas which could indicated perforated viscus. The CT was reviewed by Dr. Brown, Dr. Rubio and Dr. Anderson. It was felt that this was less likely, especially given her stable vitals. A repeat CT scan has been recommended, and we will consider EGD today.   Continue supportive care.           Thank you for your consult. I will follow-up with patient. Please contact us if you have any additional questions.    Efren Yip PA-C  Gastroenterology  [facility]

## 2020-10-30 NOTE — TRANSFER OF CARE
"Anesthesia Transfer of Care Note    Patient: Milagros Swan    Procedure(s) Performed: Procedure(s) (LRB):  EGD (ESOPHAGOGASTRODUODENOSCOPY) (N/A)    Patient location: GI    Anesthesia Type: MAC    Transport from OR: Transported from OR on room air with adequate spontaneous ventilation    Post pain: adequate analgesia    Post assessment: no apparent anesthetic complications    Post vital signs: stable    Level of consciousness: responds to stimulation    Nausea/Vomiting: no nausea/vomiting    Complications: none    Transfer of care protocol was followed      Last vitals:   Visit Vitals  /82 (BP Location: Left arm, Patient Position: Lying)   Pulse 73   Temp 36.7 °C (98.1 °F) (Temporal)   Resp 17   Ht 5' 2" (1.575 m)   Wt 67.1 kg (148 lb)   LMP  (LMP Unknown)   SpO2 100%   Breastfeeding No   BMI 27.07 kg/m²     "

## 2020-10-30 NOTE — PLAN OF CARE
Discussed poc with pt, pt verbalized understanding    modified visitor policy per CDC guidelines  allowing one visitor from 8a-6pm    Purposeful rounding every 2hours    VS wnl  EGD done today    Fall precautions in place, remains injury free    Pain and nausea under control with PRN meds    Accurate I&Os    Bed locked at lowest position  Call light within reach    Chart check complete  Will cont to monitor

## 2020-10-30 NOTE — INTERVAL H&P NOTE
The patient has been examined and the H&P has been reviewed:    I concur with the findings and no changes have occurred since H&P was written.     The risks, benefits and alternatives of the procedure were discussed with the patient in detail. This discussion was had in the presence of endoscopy staff. The risks include, risks of adverse reaction to sedation requiring the use of reversal agents, bleeding requiring blood transfusion, perforation requiring surgical intervention and technical failure. Other risks include aspiration leading to respiratory distress and respiratory failure resulting in endotracheal intubation and mechanical ventilation including death. If anesthesia is being utilized for this procedure, it is up to the anesthesiologist to determine airway safety including elective endotracheal intubation. Questions were answered, they agree to proceed. There was no language barriers.     Anesthesia/Surgery risks, benefits and alternative options discussed and understood by patient/family.          Active Hospital Problems    Diagnosis  POA    *Acute hepatitis [B17.9]  Yes    Elevated liver enzymes [R74.8]  Yes    Generalized abdominal pain [R10.84]  Yes    IV drug abuse [F19.10]  Yes    Smoker [F17.200]  Yes      Resolved Hospital Problems   No resolved problems to display.

## 2020-10-30 NOTE — SUBJECTIVE & OBJECTIVE
Past Medical History:   Diagnosis Date    Substance abuse        History reviewed. No pertinent surgical history.    Review of patient's allergies indicates:  No Known Allergies  Family History     Problem Relation (Age of Onset)    Cancer Maternal Grandmother, Maternal Grandfather    Diabetes Mother, Father        Tobacco Use    Smoking status: Current Every Day Smoker     Packs/day: 0.50     Years: 5.00     Pack years: 2.50     Types: Cigarettes    Smokeless tobacco: Current User   Substance and Sexual Activity    Alcohol use: Not Currently    Drug use: Yes     Types: IV, Methamphetamines     Comment: Reports crystal meth use today    Sexual activity: Yes     Review of Systems   Constitutional: Positive for activity change. Negative for fever.   HENT: Negative for hearing loss.    Eyes: Negative for visual disturbance.   Respiratory: Positive for shortness of breath (resolved). Negative for cough.    Cardiovascular: Negative for chest pain.   Gastrointestinal:        As per HPI.   Genitourinary: Negative for dysuria, frequency and hematuria.   Musculoskeletal: Negative for arthralgias and back pain.   Skin: Negative for rash.   Neurological: Negative for seizures, syncope, numbness and headaches.   Hematological: Does not bruise/bleed easily.   Psychiatric/Behavioral: The patient is not nervous/anxious.      Objective:     Vital Signs (Most Recent):  Temp: 98 °F (36.7 °C) (10/30/20 0728)  Pulse: 79 (10/30/20 0728)  Resp: 18 (10/30/20 0728)  BP: 118/75 (10/30/20 0728)  SpO2: 100 % (10/30/20 0728) Vital Signs (24h Range):  Temp:  [96.6 °F (35.9 °C)-98.3 °F (36.8 °C)] 98 °F (36.7 °C)  Pulse:  [] 79  Resp:  [16-18] 18  SpO2:  [97 %-100 %] 100 %  BP: (100-134)/(64-87) 118/75     Weight: 67.3 kg (148 lb 4.2 oz) (10/29/20 1226)  Body mass index is 27.12 kg/m².      Intake/Output Summary (Last 24 hours) at 10/30/2020 0847  Last data filed at 10/29/2020 1432  Gross per 24 hour   Intake 1000 ml   Output --   Net  1000 ml       Lines/Drains/Airways     Peripheral Intravenous Line                 Peripheral IV - Single Lumen 10/29/20 1342 20 G Left Antecubital less than 1 day                Physical Exam  Constitutional:       Appearance: Normal appearance. She is well-developed.   HENT:      Head: Normocephalic and atraumatic.   Eyes:      General: Scleral icterus present.      Extraocular Movements: Extraocular movements intact.   Neck:      Musculoskeletal: Normal range of motion and neck supple.      Vascular: No carotid bruit.   Cardiovascular:      Rate and Rhythm: Normal rate and regular rhythm.      Heart sounds: No murmur.   Pulmonary:      Effort: Pulmonary effort is normal. No respiratory distress.      Breath sounds: Normal breath sounds. No wheezing.   Abdominal:      General: Bowel sounds are normal. There is no distension.      Palpations: Abdomen is soft. There is no mass.      Tenderness: There is generalized abdominal tenderness (moderate). There is no rebound.   Musculoskeletal:      Right lower leg: No edema.      Left lower leg: No edema.   Skin:     General: Skin is warm and dry.      Findings: No rash.   Neurological:      Mental Status: She is alert and oriented to person, place, and time.      Cranial Nerves: No cranial nerve deficit.   Psychiatric:         Behavior: Behavior normal.         Significant Labs:  CBC:   Recent Labs   Lab 10/29/20  1342   WBC 5.27   HGB 11.9*   HCT 38.0        CMP:   Recent Labs   Lab 10/29/20  1342   GLU 91   CALCIUM 7.7*   ALBUMIN 2.8*   PROT 6.4   *   K 4.0   CO2 22*      BUN 12   CREATININE 0.7   ALKPHOS 172*   ALT 1,974*   AST 2,965*   BILITOT 7.6*     Coagulation:   Recent Labs   Lab 10/29/20  1342  10/30/20  0659   INR 1.4*   < > 1.4*   APTT 40.6*  --   --     < > = values in this interval not displayed.       Significant Imaging:  Imaging results within the past 24 hours have been reviewed.

## 2020-10-30 NOTE — ANESTHESIA POSTPROCEDURE EVALUATION
Anesthesia Post Evaluation    Patient: Milagros Swan    Procedure(s) Performed: Procedure(s) (LRB):  EGD (ESOPHAGOGASTRODUODENOSCOPY) (N/A)    Final Anesthesia Type: MAC    Patient location during evaluation: GI PACU  Patient participation: Yes- Able to Participate  Level of consciousness: awake and alert  Post-procedure vital signs: reviewed and stable  Pain management: adequate  Airway patency: patent    PONV status at discharge: No PONV  Anesthetic complications: no      Cardiovascular status: blood pressure returned to baseline and hemodynamically stable  Respiratory status: unassisted, spontaneous ventilation and room air  Hydration status: euvolemic  Follow-up not needed.          Vitals Value Taken Time   /79 10/30/20 1535   Temp 36.3 °C (97.3 °F) 10/30/20 1535   Pulse 70 10/30/20 1535   Resp 18 10/30/20 1535   SpO2 98 % 10/30/20 1535         Event Time   Out of Recovery 10/30/2020 11:15:00         Pain/Gigi Score: Pain Rating Prior to Med Admin: 7 (10/30/2020  1:51 PM)  Gigi Score: 10 (10/30/2020 11:02 AM)

## 2020-10-30 NOTE — H&P (VIEW-ONLY)
[facility] -   Gastroenterology  Consult Note    Patient Name: Milagros Swan  MRN: [id number]  Admission Date: 10/29/2020  Hospital Length of Stay: 0 days  Code Status: Full Code   Attending Provider: Felix Morgan, *   Consulting Provider: Efren Yip PA-C  Primary Care Physician: Primary Doctor No  Principal Problem:Acute hepatitis    Inpatient consult to Gastroenterology  Consult performed by: Efren Yip PA-C  Consult ordered by: Erin Sylvester NP  Reason for consult: Elevated liver enzymes        Subjective:     HPI:  The patient presented to the ER for RUQ pain. Pain started about five days ago. She now says it is diffuse. The pain is worse with movement. She is hungry today and thinks this is contributing to her pain. She also reported a change in stools, nausea, vomiting and jaundice. Her stools are light brown and loose. She denies hematemesis. She was noted to have elevated LFTs with AST and ALT into the thousands. T. Bili is 7.6 with INR of 1.4. ALB 2.8. Acetaminophen level was normal. The patient reported a history of IV drug use and reports using crystal meth the day of admit. Acute hepatitis panel pending. She denies alcohol use. US showed diffuse, non-specific, significant gallbladder wall thickening which has a laminated edematous appearance; no gallstones present; marked splenomegaly and enlarged rose hepatis lymph node which can be seen underlying liver disease. CT scan showed gallbladder wall thickening and pericholecystic fluid; top-normal spleen measuring up to 12 cm; moderate  periportal edema with mild ascites; focus of gas is seen along the posterior left subdiaphragmatic region between the spleen and the stomach worrisome for perforated viscus. Her vitals have been stable.     Past Medical History:   Diagnosis Date    Substance abuse        History reviewed. No pertinent surgical history.    Review of patient's allergies indicates:  No Known  Allergies  Family History     Problem Relation (Age of Onset)    Cancer Maternal Grandmother, Maternal Grandfather    Diabetes Mother, Father        Tobacco Use    Smoking status: Current Every Day Smoker     Packs/day: 0.50     Years: 5.00     Pack years: 2.50     Types: Cigarettes    Smokeless tobacco: Current User   Substance and Sexual Activity    Alcohol use: Not Currently    Drug use: Yes     Types: IV, Methamphetamines     Comment: Reports crystal meth use today    Sexual activity: Yes     Review of Systems   Constitutional: Positive for activity change. Negative for fever.   HENT: Negative for hearing loss.    Eyes: Negative for visual disturbance.   Respiratory: Positive for shortness of breath (resolved). Negative for cough.    Cardiovascular: Negative for chest pain.   Gastrointestinal:        As per HPI.   Genitourinary: Negative for dysuria, frequency and hematuria.   Musculoskeletal: Negative for arthralgias and back pain.   Skin: Negative for rash.   Neurological: Negative for seizures, syncope, numbness and headaches.   Hematological: Does not bruise/bleed easily.   Psychiatric/Behavioral: The patient is not nervous/anxious.      Objective:     Vital Signs (Most Recent):  Temp: 98 °F (36.7 °C) (10/30/20 0728)  Pulse: 79 (10/30/20 0728)  Resp: 18 (10/30/20 0728)  BP: 118/75 (10/30/20 0728)  SpO2: 100 % (10/30/20 0728) Vital Signs (24h Range):  Temp:  [96.6 °F (35.9 °C)-98.3 °F (36.8 °C)] 98 °F (36.7 °C)  Pulse:  [] 79  Resp:  [16-18] 18  SpO2:  [97 %-100 %] 100 %  BP: (100-134)/(64-87) 118/75     Weight: 67.3 kg (148 lb 4.2 oz) (10/29/20 1226)  Body mass index is 27.12 kg/m².      Intake/Output Summary (Last 24 hours) at 10/30/2020 0827  Last data filed at 10/29/2020 1433  Gross per 24 hour   Intake 1000 ml   Output --   Net 1000 ml       Lines/Drains/Airways     Peripheral Intravenous Line                 Peripheral IV - Single Lumen 10/29/20 1342 20 G Left Antecubital less than 1 day                 Physical Exam  Constitutional:       Appearance: Normal appearance. She is well-developed.   HENT:      Head: Normocephalic and atraumatic.   Eyes:      General: Scleral icterus present.      Extraocular Movements: Extraocular movements intact.   Neck:      Musculoskeletal: Normal range of motion and neck supple.      Vascular: No carotid bruit.   Cardiovascular:      Rate and Rhythm: Normal rate and regular rhythm.      Heart sounds: No murmur.   Pulmonary:      Effort: Pulmonary effort is normal. No respiratory distress.      Breath sounds: Normal breath sounds. No wheezing.   Abdominal:      General: Bowel sounds are normal. There is no distension.      Palpations: Abdomen is soft. There is no mass.      Tenderness: There is generalized abdominal tenderness (moderate). There is no rebound.   Musculoskeletal:      Right lower leg: No edema.      Left lower leg: No edema.   Skin:     General: Skin is warm and dry.      Findings: No rash.   Neurological:      Mental Status: She is alert and oriented to person, place, and time.      Cranial Nerves: No cranial nerve deficit.   Psychiatric:         Behavior: Behavior normal.         Significant Labs:  CBC:   Recent Labs   Lab 10/29/20  1342   WBC 5.27   HGB 11.9*   HCT 38.0        CMP:   Recent Labs   Lab 10/29/20  1342   GLU 91   CALCIUM 7.7*   ALBUMIN 2.8*   PROT 6.4   *   K 4.0   CO2 22*      BUN 12   CREATININE 0.7   ALKPHOS 172*   ALT 1,974*   AST 2,965*   BILITOT 7.6*     Coagulation:   Recent Labs   Lab 10/29/20  1342  10/30/20  0659   INR 1.4*   < > 1.4*   APTT 40.6*  --   --     < > = values in this interval not displayed.       Significant Imaging:  Imaging results within the past 24 hours have been reviewed.    Assessment/Plan:     * Acute hepatitis  Most likely drug induced injury. Acute viral hepatitis is in the differential. Panel pending.   At this time, her INR has remained stable so no evidence of failure, but will need  to monitor INR and CMP closely.   No signs of encephalopathy. Continue close monitoring of neuro status.   Recommend supportive care with IV fluids, pain management and antiemetics as needed.     Generalized abdominal pain  CT scan showed signs of acute hepatitis, but it also mentioned gas which could indicated perforated viscus. The CT was reviewed by Dr. Brown, Dr. Rubio and Dr. Anderson. It was felt that this was less likely, especially given her stable vitals. A repeat CT scan has been recommended, and we will consider EGD today.   Continue supportive care.           Thank you for your consult. I will follow-up with patient. Please contact us if you have any additional questions.    Efren Yip PA-C  Gastroenterology  [facility]

## 2020-10-30 NOTE — NURSING
Per GI request.  Bowel sounds assessed. Audible and active in all 4 Quads. Temp 97.7, /76, HR 84, SpO2 97

## 2020-10-30 NOTE — HPI
The patient presented to the ER for RUQ pain. Pain started about five days ago. She now says it is diffuse. The pain is worse with movement. She is hungry today and thinks this is contributing to her pain. She also reported a change in stools, nausea, vomiting and jaundice. Her stools are light brown and loose. She denies hematemesis. She was noted to have elevated LFTs with AST and ALT into the thousands. T. Bili is 7.6 with INR of 1.4. ALB 2.8. Acetaminophen level was normal. The patient reported a history of IV drug use and reports using crystal meth the day of admit. Acute hepatitis panel pending. She denies alcohol use. US showed diffuse, non-specific, significant gallbladder wall thickening which has a laminated edematous appearance; no gallstones present; marked splenomegaly and enlarged rose hepatis lymph node which can be seen underlying liver disease. CT scan showed gallbladder wall thickening and pericholecystic fluid; top-normal spleen measuring up to 12 cm; moderate  periportal edema with mild ascites; focus of gas is seen along the posterior left subdiaphragmatic region between the spleen and the stomach worrisome for perforated viscus. Her vitals have been stable.

## 2020-10-30 NOTE — PLAN OF CARE
Safety precautions maintained.  VSS.  NPO.  No signs of stressed noted.  Will continue to monitor.  Chart reviewed.

## 2020-10-30 NOTE — NURSING TRANSFER
Nursing Transfer Note      10/30/2020     Transfer To: endo     Transfer via wheelchair        Transported by [facility]      Chart send with patient: Yes

## 2020-10-30 NOTE — ASSESSMENT & PLAN NOTE
-GI consulted  -Abdominal u/s negative for sonographic Gutiérrez's sign (patient complains of diffuse pain).  -Abdominal CT with contrast raises concern for gastroesophageal perforation  -Repeat abdominal CT without contrast this AM-results pending  -May consider EGD   -Hydrocodone-acetaminophen 10mg q6h PRN available.

## 2020-10-30 NOTE — ASSESSMENT & PLAN NOTE
CT scan showed signs of acute hepatitis, but it also mentioned gas which could indicated perforated viscus. The CT was reviewed by Dr. Brown, Dr. Rubio and Dr. Anderson. It was felt that this was less likely, especially given her stable vitals. A repeat CT scan has been recommended, and we will consider EGD today.   Continue supportive care.

## 2020-10-30 NOTE — MEDICAL/APP STUDENT
"[facility]  History & Physical    Patient Name: Milagros Swan  MRN: [id number]  Admission Date: 10/30/2020  Attending Physician:       PCP:     Primary Doctor No    CC:     Chief Complaint   Patient presents with    Abdominal Pain     x2-3 days, RUQ pain, "oily stools", hx of IVDU       HISTORY OF PRESENT ILLNESS:     Milagros Swan is a 30 y.o. female presents to the ED after a 3 day history of RUQ pain. She states the pain is approaching 10/10 and is localized to the RUQ. She is also experiencing nausea and vomiting and states that yesterday she started to have diarrhea. She has noticed that her stool is a lighter color than what she is used to and that her urine is a 'pineda' color. She has no history of any GI abnormalities and has never experienced this pain before. She does have a history of IVDU and uses crystal meth daily and is a current smoker. She denies EtOH use.             REVIEW OF SYSTEMS:     Review of Systems   Constitutional: Negative for chills and fever.   HENT: Negative for congestion and sore throat.    Eyes: Negative for blurred vision and double vision.   Respiratory: Negative for cough and hemoptysis.    Cardiovascular: Negative for chest pain and palpitations.   Gastrointestinal: Positive for abdominal pain, diarrhea, nausea and vomiting. Negative for blood in stool.   Genitourinary: Negative for dysuria and urgency.   Musculoskeletal: Negative for back pain and neck pain.   Skin: Negative for itching and rash.   Neurological: Negative for dizziness, seizures and headaches.   Endo/Heme/Allergies: Negative for environmental allergies. Does not bruise/bleed easily.       PAST MEDICAL / SURGICAL HISTORY:     Past Medical History:   Diagnosis Date    Substance abuse      History reviewed. No pertinent surgical history.      FAMILY HISTORY:     Family History   Problem Relation Age of Onset    Diabetes Mother     Diabetes Father     Cancer " Maternal Grandmother     Cancer Maternal Grandfather          SOCIAL HISTORY:     Social History     Socioeconomic History    Marital status: Single     Spouse name: Not on file    Number of children: Not on file    Years of education: Not on file    Highest education level: Not on file   Occupational History    Not on file   Social Needs    Financial resource strain: Not on file    Food insecurity     Worry: Not on file     Inability: Not on file    Transportation needs     Medical: Not on file     Non-medical: Not on file   Tobacco Use    Smoking status: Current Every Day Smoker     Packs/day: 0.50     Years: 5.00     Pack years: 2.50     Types: Cigarettes    Smokeless tobacco: Current User   Substance and Sexual Activity    Alcohol use: Not Currently    Drug use: Yes     Types: IV, Methamphetamines     Comment: Reports crystal meth use today    Sexual activity: Yes   Lifestyle    Physical activity     Days per week: Not on file     Minutes per session: Not on file    Stress: Not on file   Relationships    Social connections     Talks on phone: Not on file     Gets together: Not on file     Attends Alevism service: Not on file     Active member of club or organization: Not on file     Attends meetings of clubs or organizations: Not on file     Relationship status: Not on file   Other Topics Concern    Not on file   Social History Narrative    Not on file         ALLERGIES:       Review of patient's allergies indicates:  No Known Allergies        HOME MEDICATIONS:     Prior to Admission medications    Not on File          HOSPITAL MEDICATIONS:     Scheduled Meds:   Continuous Infusions:   PRN Meds: acetaminophen, ondansetron      PHYSICAL EXAM:     Wt Readings from Last 1 Encounters:   10/29/20 1226 67.3 kg (148 lb 4.2 oz)     Body mass index is 27.12 kg/m².    Vital Signs (Most Recent)  Temp: 98 °F (36.7 °C) (10/30/20 0728)  Pulse: 79 (10/30/20 0728)  Resp: 18 (10/30/20 0728)  BP: 118/75  (10/30/20 0728)  SpO2: 100 % (10/30/20 0728)    Vital Signs Range (Last 24H):  Temp:  [96.6 °F (35.9 °C)-98.3 °F (36.8 °C)]   Pulse:  []   Resp:  [16-18]   BP: (100-134)/(64-87)   SpO2:  [97 %-100 %]      Physical Exam      LABORATORY STUDIES:     Laboratory:  {Results:09776}  {Results:48797}        MICROBIOLOGY DATA:     No results found for: LABGENI, LABREFE, LABUPPE, LABURIN, LABAFB    Microbiology x 7d:   Microbiology Results (last 7 days)     Procedure Component Value Units Date/Time    Blood culture [[id number]] Collected: 10/29/20 1923    Order Status: Sent Specimen: Blood from Antecubital, Right Updated: 10/30/20 0304    Blood culture [[id number]] Collected: 10/29/20 1923    Order Status: Sent Specimen: Blood from Antecubital, Right Updated: 10/30/20 0304            IMAGING:     Imaging Results          CT Abdomen Pelvis With Contrast (Final result)  Result time 10/29/20 18:03:51    Final result by Terry Coats MD (10/29/20 18:03:51)                 Impression:      Marked the abnormal gallbladder with gallbladder wall thickening and pericholecystic fluid.    Top-normal spleen measuring up to 12 cm.    Moderate  periportal edema with mild ascites.  Correlate clinically for 3rd spacing.    Focus of gas is seen along the posterior left subdiaphragmatic region between the spleen and the stomach worrisome for perforated viscus.  Recommend clinical correlation for gastroesophageal perforation and short-term follow-up.      Electronically signed by: Jorge L Stewart  Date:    10/29/2020  Time:    18:03             Narrative:    EXAMINATION:  CT ABDOMEN PELVIS WITH CONTRAST    CLINICAL HISTORY:  Abdominal pain, acute, nonlocalized;    TECHNIQUE:  Low dose axial images, sagittal and coronal reformations were obtained from the lung bases to the pubic symphysis following the IV administration of we will 75 mL of Omnipaque 350 no oral contrast was utilized yes    COMPARISON:  Prior gallbladder  ultrasound    FINDINGS:  Focus of gas is seen along the posterior left subdiaphragmatic region between the spleen and the stomach worrisome for perforated viscus. Recommend clinical correlation for gastroesophageal perforation and short-term follow-up.    Bilateral clips in the adnexal region suggestive of tubal ligation.  Fluid-filled small bowel loops measuring up to 2.5 cm.  Moderate amount of fluid in the gallbladder fossa with no calcified gallstones.  Mild ascites along the right lobe of the liver with periportal edema.  The spleen is top normal.  Pancreas is unremarkable.  Mild amount of fluid in the dependent portion of the pelvis.                               US Abdomen Limited (Gallbladder) (Final result)  Result time 10/29/20 14:14:31    Final result by Mychal Montoya MD (10/29/20 14:14:31)                 Impression:      Markedly abnormal gallbladder.  There is diffuse, non-specific, significant gallbladder wall thickening which has a laminated edematous appearance.  No gallstones present.  Negative sonographic Gutiérrez's sign (patient complains of diffuse pain).    Marked splenomegaly.  Liver normal size.  Enlarged rose hepatis lymph node which can be seen underlying liver disease.  This could also explain the markedly thickened gallbladder wall which can be seen with underlying liver disease.    Given constellation of findings, consider further evaluation with contrast-enhanced CT..      Electronically signed by: Mychal Montoya MD  Date:    10/29/2020  Time:    14:14             Narrative:    EXAMINATION:  US ABDOMEN LIMITED    CLINICAL HISTORY:  Unspecified jaundice    TECHNIQUE:  Limited ultrasound of the right upper quadrant of the abdomen (including pancreas, liver, gallbladder, common bile duct, and right kidney) was performed.    COMPARISON:  None.    FINDINGS:  Liver: The liver is normal size and demonstrates normal echogenicity.    Gallbladder: Markedly abnormal appearing gallbladder.  There is  marked diffuse gallbladder wall thickening which has a laminated edematous appearance and demonstrates a maximum thickness of approximately 14 mm.  No gallstones are evident.  Negative sonographic Gutiérrez's sign.    Biliary system: The common duct is not dilated, measuring 4 mm.  No intrahepatic ductal dilatation.    Pancreas: The visualized portions of pancreas appear normal.    Right kidney: Normal in size with no hydronephrosis and measures 12 cm.    Miscellaneous: Spleen is enlarged a 17 cm maximum length.    There is an enlarged lymph node noted at the rose hepatis measuring approximately 1.6 x 1.4 cm                                  CONSULTS:     IP CONSULT TO GI       ASSESSMENT & PLAN:     Primary Diagnosis:  Acute hepatitis    Active Hospital Problems    Diagnosis  POA    *Acute hepatitis [B17.9]  Yes    Elevated liver enzymes [R74.8]  Yes    Generalized abdominal pain [R10.84]  Yes    IV drug abuse [F19.10]  Yes    Smoker [F17.200]  Yes      Resolved Hospital Problems   No resolved problems to display.             VTE Risk Mitigation (From admission, onward)         Ordered     IP VTE LOW RISK PATIENT  Once      10/29/20 1719     Place sequential compression device  Until discontinued      10/29/20 1719     Place BLANCA hose  Until discontinued      10/29/20 1719              ** RUQ pain   - GI was consulted    - Keep NPO   - AST and ALT > 1,000   - CT scan 10/29/20 showing a focus of gas is seen along the posterior left subdiaphragmatic  region between the spleen and the stomach worrisome for perforated viscus.    - Repeat CT scan today   - Plan for EGD today    ** IVDU    - Reports daily crystal meth usage    ** Smoking   - Offer smoking cessation      ===============================================================    Leyda Pedroza, MS-3

## 2020-10-30 NOTE — PLAN OF CARE
Transferred to room A-545 per stretcher.  Safe transfer to bed with siderails up x4 and call bell in reached.  Received by relief nurse because Linda was off the unit momentarily.

## 2020-10-30 NOTE — PLAN OF CARE
CM spoke with pt to complete initial assessment.  Pt independent of ADLs.  No DME in use.  Pt manages all healthcare needs.  Lives with brother.  Family or friend will be help if needed.  Denies discharge needs at this time. Pt will likely need [facility] referral for hepatology and needs set up with [facility] for PCP.  Updated white board with CM contact information provided.  Transitional care folder given to pt, information on bedside delivery, [facility], discharge begins on admit pamphlet, and  advance directive information provided to pt.  Instructed to call CM with questions or concerns.       D/c plan: home  Transport home: family or friend  PCP: none  Preferred pharmacy: [facility]  Bedside delivery: yes  [facility]: pending.     10/30/20 1410   Discharge Assessment   Assessment Type Discharge Planning Assessment   Confirmed/corrected address and phone number on facesheet? Yes   Assessment information obtained from? Patient   Expected Length of Stay (days)   (TBD)   Communicated expected length of stay with patient/caregiver yes   Prior to hospitilization cognitive status: Alert/Oriented;No Deficits   Prior to hospitalization functional status: Independent   Current cognitive status: Alert/Oriented;No Deficits   Current Functional Status: Independent   Facility Arrived From: home   Lives With sibling(s)   Able to Return to Prior Arrangements yes   Is patient able to care for self after discharge? Yes   Who are your caregiver(s) and their phone number(s)? Alexy Swan, father, [phone]   Patient's perception of discharge disposition home or selfcare   Readmission Within the Last 30 Days no previous admission in last 30 days   Patient currently being followed by outpatient case management? No   Patient currently receives any other outside agency services? No   Equipment Currently Used at Home none   Part D Coverage na   Do you have any problems affording any of your prescribed medications? No   Is the patient taking  medications as prescribed? yes   Does the patient have transportation home? Yes   Transportation Anticipated family or friend will provide   Dialysis Name and Scheduled days na   Does the patient receive services at the [facility]mad[facility] Clinic? No   Discharge Plan A Home;Home with family   DME Needed Upon Discharge  none   Patient/Family in Agreement with Plan yes

## 2020-10-30 NOTE — [employer]ATION PATIENT INSTRUCTIONS
Discharge Summary/Instructions after an Endoscopic Procedure  Patient Name: Milagros Swan  Patient MRN: [id number]  Patient YOB: 1990  Friday, October 30, 2020 Livia Brown MD  RESTRICTIONS:  During your procedure today, you received medications for sedation.  These   medications may affect your judgment, balance and coordination.  Therefore,   for 24 hours, you have the following restrictions:   - DO NOT drive a car, operate machinery, make legal/financial decisions,   sign important papers or drink alcohol.    ACTIVITY:  Today: no heavy lifting, straining or running due to procedural   sedation/anesthesia.  The following day: return to full activity including work.  DIET:  Eat and drink normally unless instructed otherwise.     TREATMENT FOR COMMON SIDE EFFECTS:  - Mild abdominal pain, nausea, belching, bloating or excessive gas:  rest,   eat lightly and use a heating pad.  - Sore Throat: treat with throat lozenges and/or gargle with warm salt   water.  - Because air was used during the procedure, expelling large amounts of air   from your rectum or belching is normal.  - If a bowel prep was taken, you may not have a bowel movement for 1-3 days.    This is normal.  SYMPTOMS TO WATCH FOR AND REPORT TO YOUR PHYSICIAN:  1. Abdominal pain or bloating, other than gas cramps.  2. Chest pain.  3. Back pain.  4. Signs of infection such as: chills or fever occurring within 24 hours   after the procedure.  5. Rectal bleeding, which would show as bright red, maroon, or black stools.   (A tablespoon of blood from the rectum is not serious, especially if   hemorrhoids are present.)  6. Vomiting.  7. Weakness or dizziness.  GO DIRECTLY TO THE NEAREST EMERGENCY ROOM IF YOU HAVE ANY OF THE FOLLOWING:      Difficulty breathing              Chills and/or fever over 101 F   Persistent vomiting and/or vomiting blood   Severe abdominal pain   Severe chest pain   Black, tarry stools   Bleeding- more than one  tablespoon   Any other symptom or condition that you feel may need urgent attention  Your doctor recommends these additional instructions:  If any biopsies were taken, your doctors clinic will contact you in 1 to 2   weeks with any results.  - Return patient to hospital tai for ongoing care.   - Mechanical soft diet.   - Continue present medications.   - Use Protonix (pantoprazole) 40 mg PO BID.   - Check liver enzymes (AST, ALT, alkaline phosphatase, bilirubin) q 4 hours   for one day.   - Check PT/INR q 4 hours for one day.   - The findings and recommendations were discussed with the referring   physician, Dr. Dorsey.   - The findings and recommendations were discussed with the patient.  For questions, problems or results please call your physician Livia Brown MD at Work:  [phone]  If you have any questions about the above instructions, call the GI   department at [phone] or call the endoscopy unit at [phone]   from 7am until 3 pm.  [facility] [place] [facility], EMERGENCY ROOM PHONE NUMBER:   [phone]  IF A COMPLICATION OR EMERGENCY SITUATION ARISES AND YOU ARE UNABLE TO REACH   YOUR PHYSICIAN - GO DIRECTLY TO THE EMERGENCY ROOM.  I have read or have had read to me these discharge instructions for my   procedure and have received a written copy.  I understand these   instructions and will follow-up with my physician if I have any questions.     __________________________________       _____________________________________  Nurse Signature                                          Patient/Designated   Responsible Party Signature  MD Livia Zhang MD  10/30/2020 10:50:30 AM  This report has been verified and signed electronically.  [employer]

## 2020-10-30 NOTE — SUBJECTIVE & OBJECTIVE
Interval History: Pt endorses RUQ pain. Hydrocodone-acetaminophen 10mg ordered. Concern for gastroesophageal perforation necessitated a repeat abdominal CT. EGD being considered. GI suspects patient may have to spend another night pending abdominal CT results.     Review of Systems   Constitutional: Negative for appetite change, chills and unexpected weight change.   HENT: Negative for congestion, mouth sores, sinus pressure, sore throat and trouble swallowing.    Eyes: Negative for photophobia and visual disturbance.   Respiratory: Negative for cough, shortness of breath and wheezing.    Cardiovascular: Negative for chest pain, palpitations and leg swelling.   Gastrointestinal: Positive for abdominal pain (RUE ) and nausea. Negative for abdominal distention, rectal pain and vomiting.   Endocrine: Negative for polydipsia, polyphagia and polyuria.   Genitourinary: Negative for flank pain, frequency and hematuria.   Musculoskeletal: Negative for back pain and neck stiffness.   Skin: Negative for pallor and rash.   Allergic/Immunologic: Negative for immunocompromised state.   Neurological: Negative for dizziness, tremors, seizures, syncope, speech difficulty, weakness and headaches.   Hematological: Negative for adenopathy. Does not bruise/bleed easily.   Psychiatric/Behavioral: Negative for agitation, confusion and suicidal ideas.     Objective:     Vital Signs (Most Recent):  Temp: 98 °F (36.7 °C) (10/30/20 0728)  Pulse: 79 (10/30/20 0728)  Resp: 18 (10/30/20 0728)  BP: 118/75 (10/30/20 0728)  SpO2: 100 % (10/30/20 0728) Vital Signs (24h Range):  Temp:  [96.6 °F (35.9 °C)-98.3 °F (36.8 °C)] 98 °F (36.7 °C)  Pulse:  [] 79  Resp:  [16-18] 18  SpO2:  [97 %-100 %] 100 %  BP: (100-134)/(64-87) 118/75     Weight: 67.3 kg (148 lb 4.2 oz)  Body mass index is 27.12 kg/m².    Intake/Output Summary (Last 24 hours) at 10/30/2020 0901  Last data filed at 10/29/2020 1433  Gross per 24 hour   Intake 1000 ml   Output --   Net  1000 ml      Physical Exam  Constitutional:       Appearance: Normal appearance.   HENT:      Head: Normocephalic and atraumatic.      Nose: No congestion or rhinorrhea.      Mouth/Throat:      Pharynx: No posterior oropharyngeal erythema.   Eyes:      General: Scleral icterus present.      Extraocular Movements: Extraocular movements intact.      Pupils: Pupils are equal, round, and reactive to light.   Neck:      Musculoskeletal: Normal range of motion and neck supple. No neck rigidity.      Vascular: No carotid bruit.   Cardiovascular:      Rate and Rhythm: Normal rate and regular rhythm.   Pulmonary:      Effort: Pulmonary effort is normal. No respiratory distress.      Breath sounds: Normal breath sounds. No stridor. No wheezing.   Abdominal:      General: There is no distension.      Palpations: Abdomen is soft.      Tenderness: There is abdominal tenderness (RUE). There is no guarding.   Musculoskeletal: Normal range of motion.         General: No swelling or deformity.   Skin:     General: Skin is warm and dry.      Capillary Refill: Capillary refill takes less than 2 seconds.      Coloration: Skin is not jaundiced.      Findings: No erythema or rash.   Neurological:      Mental Status: She is alert and oriented to person, place, and time.      Motor: No weakness.   Psychiatric:         Mood and Affect: Mood normal.         Behavior: Behavior normal.         Thought Content: Thought content normal.         Judgment: Judgment normal.         Significant Labs: All pertinent labs within the past 24 hours have been reviewed.    Significant Imaging: I have reviewed all pertinent imaging results/findings within the past 24 hours.

## 2020-10-30 NOTE — ANESTHESIA PREPROCEDURE EVALUATION
10/30/2020  Milagros Swan is a 30 y.o., female.    Anesthesia Evaluation    I have reviewed the Patient Summary Reports.    I have reviewed the Nursing Notes. I have reviewed the NPO Status.   I have reviewed the Medications.     Review of Systems  Anesthesia Hx:  Denies Family Hx of Anesthesia complications.   Denies Personal Hx of Anesthesia complications.   Social:  Smoker  Illicit Drug Use: Types of drugs include Amphetamines, Marijuana, IV drug use, Methamphetamines,   Cardiovascular:  Cardiovascular Normal     Pulmonary:  Pulmonary Normal    Hepatic/GI:   Hepatitis  Hepatic/GI Symptoms: abdominal pain.    Neurological:  Neurology Normal        Physical Exam  General:  Well nourished    Airway/Jaw/Neck:  Airway Findings: Mouth Opening: Normal Tongue: Normal  General Airway Assessment: Adult  Mallampati: II  TM Distance: Normal, at least 6 cm      Dental:  Dental Findings: In tact        Mental Status:  Mental Status Findings:  Cooperative, Alert and Oriented         Anesthesia Plan  Type of Anesthesia, risks & benefits discussed:  Anesthesia Type:  MAC  Patient's Preference:   Intra-op Monitoring Plan: standard ASA monitors  Intra-op Monitoring Plan Comments:   Post Op Pain Control Plan: per primary service following discharge from PACU  Post Op Pain Control Plan Comments:   Induction:    Beta Blocker:  Patient is not currently on a Beta-Blocker (No further documentation required).       Informed Consent: Patient understands risks and agrees with Anesthesia plan.  Questions answered. Anesthesia consent signed with patient.  ASA Score: 2     Day of Surgery Review of History & Physical: I have interviewed and examined the patient. I have reviewed the patient's H&P dated: 10/29/2020. There are no significant changes.          Ready For Surgery From Anesthesia Perspective.

## 2020-10-31 VITALS
BODY MASS INDEX: 27.23 KG/M2 | RESPIRATION RATE: 15 BRPM | OXYGEN SATURATION: 100 % | DIASTOLIC BLOOD PRESSURE: 74 MMHG | HEART RATE: 71 BPM | TEMPERATURE: 98 F | WEIGHT: 148 LBS | SYSTOLIC BLOOD PRESSURE: 113 MMHG | HEIGHT: 62 IN

## 2020-10-31 LAB
ALBUMIN SERPL BCP-MCNC: 2.3 G/DL (ref 3.5–5.2)
ALBUMIN SERPL BCP-MCNC: 2.4 G/DL (ref 3.5–5.2)
ALP SERPL-CCNC: 143 U/L (ref 55–135)
ALP SERPL-CCNC: 147 U/L (ref 55–135)
ALT SERPL W/O P-5'-P-CCNC: 1749 U/L (ref 10–44)
ALT SERPL W/O P-5'-P-CCNC: 1900 U/L (ref 10–44)
ANION GAP SERPL CALC-SCNC: 4 MMOL/L (ref 8–16)
ANION GAP SERPL CALC-SCNC: 5 MMOL/L (ref 8–16)
ANISOCYTOSIS BLD QL SMEAR: SLIGHT
AST SERPL-CCNC: 2195 U/L (ref 10–40)
AST SERPL-CCNC: 2385 U/L (ref 10–40)
BASOPHILS # BLD AUTO: 0 K/UL (ref 0–0.2)
BASOPHILS NFR BLD: 0 % (ref 0–1.9)
BILIRUB SERPL-MCNC: 7.9 MG/DL (ref 0.1–1)
BILIRUB SERPL-MCNC: 8 MG/DL (ref 0.1–1)
BUN SERPL-MCNC: 7 MG/DL (ref 6–20)
BUN SERPL-MCNC: 8 MG/DL (ref 6–20)
BURR CELLS BLD QL SMEAR: ABNORMAL
CALCIUM SERPL-MCNC: 7.4 MG/DL (ref 8.7–10.5)
CALCIUM SERPL-MCNC: 7.6 MG/DL (ref 8.7–10.5)
CHLORIDE SERPL-SCNC: 101 MMOL/L (ref 95–110)
CHLORIDE SERPL-SCNC: 103 MMOL/L (ref 95–110)
CO2 SERPL-SCNC: 23 MMOL/L (ref 23–29)
CO2 SERPL-SCNC: 27 MMOL/L (ref 23–29)
CREAT SERPL-MCNC: 0.7 MG/DL (ref 0.5–1.4)
CREAT SERPL-MCNC: 0.7 MG/DL (ref 0.5–1.4)
DACRYOCYTES BLD QL SMEAR: ABNORMAL
DIFFERENTIAL METHOD: ABNORMAL
EOSINOPHIL # BLD AUTO: 0 K/UL (ref 0–0.5)
EOSINOPHIL NFR BLD: 0.3 % (ref 0–8)
ERYTHROCYTE [DISTWIDTH] IN BLOOD BY AUTOMATED COUNT: 18.1 % (ref 11.5–14.5)
EST. GFR  (AFRICAN AMERICAN): >60 ML/MIN/1.73 M^2
EST. GFR  (AFRICAN AMERICAN): >60 ML/MIN/1.73 M^2
EST. GFR  (NON AFRICAN AMERICAN): >60 ML/MIN/1.73 M^2
EST. GFR  (NON AFRICAN AMERICAN): >60 ML/MIN/1.73 M^2
GLUCOSE SERPL-MCNC: 83 MG/DL (ref 70–110)
GLUCOSE SERPL-MCNC: 86 MG/DL (ref 70–110)
HCT VFR BLD AUTO: 33.6 % (ref 37–48.5)
HGB BLD-MCNC: 10.9 G/DL (ref 12–16)
IMM GRANULOCYTES # BLD AUTO: 0.01 K/UL (ref 0–0.04)
IMM GRANULOCYTES NFR BLD AUTO: 0.3 % (ref 0–0.5)
INR PPP: 1.4 (ref 0.8–1.2)
INR PPP: 1.6 (ref 0.8–1.2)
LYMPHOCYTES # BLD AUTO: 1.4 K/UL (ref 1–4.8)
LYMPHOCYTES NFR BLD: 43.6 % (ref 18–48)
MCH RBC QN AUTO: 25.1 PG (ref 27–31)
MCHC RBC AUTO-ENTMCNC: 32.4 G/DL (ref 32–36)
MCV RBC AUTO: 77 FL (ref 82–98)
MONOCYTES # BLD AUTO: 0.3 K/UL (ref 0.3–1)
MONOCYTES NFR BLD: 10 % (ref 4–15)
NEUTROPHILS # BLD AUTO: 1.5 K/UL (ref 1.8–7.7)
NEUTROPHILS NFR BLD: 45.8 % (ref 38–73)
NRBC BLD-RTO: 0 /100 WBC
OVALOCYTES BLD QL SMEAR: ABNORMAL
PLATELET # BLD AUTO: 118 K/UL (ref 150–350)
PLATELET BLD QL SMEAR: ABNORMAL
PMV BLD AUTO: ABNORMAL FL (ref 9.2–12.9)
POIKILOCYTOSIS BLD QL SMEAR: SLIGHT
POTASSIUM SERPL-SCNC: 3.6 MMOL/L (ref 3.5–5.1)
POTASSIUM SERPL-SCNC: 4 MMOL/L (ref 3.5–5.1)
PROT SERPL-MCNC: 5.5 G/DL (ref 6–8.4)
PROT SERPL-MCNC: 5.7 G/DL (ref 6–8.4)
PROTHROMBIN TIME: 14.4 SEC (ref 9–12.5)
PROTHROMBIN TIME: 16.9 SEC (ref 9–12.5)
RBC # BLD AUTO: 4.35 M/UL (ref 4–5.4)
SODIUM SERPL-SCNC: 130 MMOL/L (ref 136–145)
SODIUM SERPL-SCNC: 133 MMOL/L (ref 136–145)
WBC # BLD AUTO: 3.21 K/UL (ref 3.9–12.7)

## 2020-10-31 PROCEDURE — 36415 COLL VENOUS BLD VENIPUNCTURE: CPT

## 2020-10-31 PROCEDURE — 85610 PROTHROMBIN TIME: CPT

## 2020-10-31 PROCEDURE — 25000003 PHARM REV CODE 250: Performed by: INTERNAL MEDICINE

## 2020-10-31 PROCEDURE — 85025 COMPLETE CBC W/AUTO DIFF WBC: CPT

## 2020-10-31 PROCEDURE — 99232 SBSQ HOSP IP/OBS MODERATE 35: CPT | Mod: ,,, | Performed by: INTERNAL MEDICINE

## 2020-10-31 PROCEDURE — 80053 COMPREHEN METABOLIC PANEL: CPT

## 2020-10-31 PROCEDURE — 99232 PR SUBSEQUENT HOSPITAL CARE,LEVL II: ICD-10-PCS | Mod: ,,, | Performed by: INTERNAL MEDICINE

## 2020-10-31 RX ORDER — PANTOPRAZOLE SODIUM 40 MG/1
40 TABLET, DELAYED RELEASE ORAL
Qty: 60 TABLET | Refills: 0 | Status: SHIPPED | OUTPATIENT
Start: 2020-10-31 | End: 2022-05-13

## 2020-10-31 RX ADMIN — PANTOPRAZOLE SODIUM 40 MG: 40 TABLET, DELAYED RELEASE ORAL at 06:10

## 2020-10-31 NOTE — PROGRESS NOTES
[facility] -   Gastroenterology  Progress Note    Patient Name: Milagros Swan  MRN: [id number]  Admission Date: 10/29/2020  Hospital Length of Stay: 1 days  Code Status: Full Code   Attending Provider: Felix Morgan, *  Consulting Provider: Livia Brown MD  Primary Care Physician: Primary Doctor No  Principal Problem: Acute hepatitis    Subjective:     Interval History: sleeping, blunt affect. Eyes remained closed entire time of my visit. Discussed hepatitis serologies. Understands she has acute hepatitis A. Her enzymes are trending down and she is safe to discharge. She complains of nausea. Explained this is likely due to the hepatitis. Reviewed EGD results with her. We discussed her Hepatitis C status and she desires treatment.     Review of Systems  Objective:     Vital Signs (Most Recent):  Temp: 98.3 °F (36.8 °C) (10/31/20 0731)  Pulse: 71 (10/31/20 0731)  Resp: 15 (10/31/20 0731)  BP: 113/74 (10/31/20 0731)  SpO2: 100 % (10/31/20 0731) Vital Signs (24h Range):  Temp:  [96.7 °F (35.9 °C)-98.3 °F (36.8 °C)] 98.3 °F (36.8 °C)  Pulse:  [63-86] 71  Resp:  [15-18] 15  SpO2:  [98 %-100 %] 100 %  BP: ()/(61-83) 113/74     Weight: 67.1 kg (148 lb) (10/30/20 0933)  Body mass index is 27.07 kg/m².      Intake/Output Summary (Last 24 hours) at 10/31/2020 1123  Last data filed at 10/30/2020 1500  Gross per 24 hour   Intake 240 ml   Output --   Net 240 ml       Lines/Drains/Airways     Peripheral Intravenous Line                 Peripheral IV - Single Lumen 10/29/20 1342 20 G Left Antecubital 1 day                Physical Exam  Constitutional:       Appearance: She is ill-appearing.   Eyes:      General: Scleral icterus present.   Abdominal:      General: Bowel sounds are normal.      Palpations: Abdomen is soft.      Tenderness: There is no abdominal tenderness. There is no guarding or rebound.   Neurological:      General: No focal deficit present.      Mental Status: She is alert  and oriented to person, place, and time. Mental status is at baseline.   Psychiatric:         Attention and Perception: Attention normal.         Mood and Affect: Mood is depressed. Affect is flat.         Speech: She is noncommunicative.         Behavior: Behavior is uncooperative.         Thought Content: Thought content normal.         Cognition and Memory: Cognition normal.         Judgment: Judgment is inappropriate.         Significant Labs:  CMP:   Recent Labs   Lab 10/31/20  0343   GLU 83   CALCIUM 7.4*   ALBUMIN 2.3*   PROT 5.5*   *   K 3.6   CO2 27      BUN 7   CREATININE 0.7   ALKPHOS 143*   ALT 1,749*   AST 2,195*   BILITOT 7.9*     Coagulation:   Recent Labs   Lab 10/29/20  1342  10/31/20  0343   INR 1.4*   < > 1.4*   APTT 40.6*  --   --     < > = values in this interval not displayed.         Significant Imaging:  Imaging results within the past 24 hours have been reviewed.    Assessment/Plan:     Active Diagnoses:    Diagnosis Date Noted POA    PRINCIPAL PROBLEM:  Acute hepatitis [B17.9] 10/29/2020 Yes    Elevated liver enzymes [R74.8] 10/29/2020 Yes    Generalized abdominal pain [R10.84] 10/29/2020 Yes    IV drug abuse [F19.10] 10/29/2020 Yes    Smoker [F17.200] 10/29/2020 Yes      Problems Resolved During this Admission:       A: 30 y.o female with acute hepatitis A, resolving.    Recommendations:  1. Ok to discharge home  2. Will arrange liver clinic follow up with [facility] and to discuss HCV treatment options.      Discussed with Aparna DAVIES, [facility] medicine. I will sign off. Please contact us if you have any additional questions.    Livia Brown MD  Gastroenterology  [facility] -

## 2020-10-31 NOTE — PLAN OF CARE
Patient received on shift ambulating to the bathroom,  x4, in no acute distress. Vitals present within stable limits. Medications/pain management accepted and tolerated well. Safety precautions maintained.  Will continue to monitor progress.

## 2020-10-31 NOTE — PLAN OF CARE
Patient remains free from injury/fall.  Pain controlled at this time.  Will continue to monitor, administer meds as ordered, provide comfort/safety measures and notify MD of any changes in condition.

## 2020-10-31 NOTE — DISCHARGE SUMMARY
"[facility] Hospital Medicine  Discharge Summary      Patient Name: Milagros Swan  MRN: [id number]  Admission Date: 10/29/2020  Hospital Length of Stay: 1 days  Discharge Date and Time:  10/31/2020 11:08 AM  Attending Physician: Felix Morgan, *   Discharging Provider: Aparna Arrington NP  Primary Care Provider: Primary Doctor No      HPI:   Ms Swan is a 30 y.o. female patient with a h/o IV drug use who presents to the ED endorsing RUQ abd pain x 2-3 days. Reports constant pain and rates it 6/10 which is exacerbated by movement.  She reports "oily stools" and nausea. She also reports using crystal meth earlier today. States she has never prepared the needles herself and is unsure if there are multiple users involved. Patient denies any fever, chills, emesis, diarrhea, SOB, CP, dysuria, hematuria, light-headedness, dizziness. She will be admitted to [facility] for management.     ED workup: aPTT 40.6, Serum Tylenol <3.0, U/A SPG >1.030, Sodium 133, Ca 7.7, Albumin 2.8, Bilirubin 7.6, Alkaline Phosphatase 172, AST 2965, ALT 1974, Amylase 47, Lipase 38, PT 15.1, INR 1.4  She is a [employer]. Her SDM is her mother Anisha Brower, [phone]      Procedure(s) (LRB):  EGD (ESOPHAGOGASTRODUODENOSCOPY) (N/A)      Hospital Course:   10/29/2020: Presents to ED endorsing RUQ pain for 2-3 days. Admitted to [facility] for management  10/30/2020: Abdominal U/s shows performed. Abdominal CT raised suspicion for gastroesophageal perforation, subsequently a repeat non contrast abdominal CT was recommended- negative for free air. GI also considering EGD. As of 10/31 EGD showed gastritis, biopsies were taken and sent for pathology. Liver enzymes trending down slowing. INR 1.4. Case discussed with keshav Kingsley to discharge patient from GI standpoint. Social work consult for [facility] referral for hepatology. Patient to follow-up with PCP in 3 days for hospital follow-up of acute hepatitis A. Patient seen and " examined on the date of discharge and found suitable for discharge.      Consults:   Consults (From admission, onward)        Status Ordering Provider     Inpatient consult to Gastroenterology  Once     Provider:  (Not yet assigned)    Completed COREY BANKS     Inpatient consult to Social Work  Once     Provider:  (Not yet assigned)    Acknowledged PAVAN STEPHENS          No new Assessment & Plan notes have been filed under this hospital service since the last note was generated.  Service: Hospital Medicine    Final Active Diagnoses:    Diagnosis Date Noted POA    PRINCIPAL PROBLEM:  Acute hepatitis [B17.9] 10/29/2020 Yes    Elevated liver enzymes [R74.8] 10/29/2020 Yes    Generalized abdominal pain [R10.84] 10/29/2020 Yes    IV drug abuse [F19.10] 10/29/2020 Yes    Smoker [F17.200] 10/29/2020 Yes      Problems Resolved During this Admission:       Discharged Condition: stable    Disposition:     Follow Up:  Follow-up Information     Follow up In 3 days.    Why: for hospital follow-up   Contact information:  Follow-up with PCP                Patient Instructions:   No discharge procedures on file.    Significant Diagnostic Studies: Labs:   BMP:   Recent Labs   Lab 10/30/20  2003 10/30/20  2357 10/31/20  0343   GLU 98 86 83   * 130* 133*   K 4.3 4.0 3.6    103 101   CO2 24 23 27   BUN 8 8 7   CREATININE 0.7 0.7 0.7   CALCIUM 7.7* 7.6* 7.4*   , CMP   Recent Labs   Lab 10/30/20  2003 10/30/20  2357 10/31/20  0343   * 130* 133*   K 4.3 4.0 3.6    103 101   CO2 24 23 27   GLU 98 86 83   BUN 8 8 7   CREATININE 0.7 0.7 0.7   CALCIUM 7.7* 7.6* 7.4*   PROT 5.8* 5.7* 5.5*   ALBUMIN 2.4* 2.4* 2.3*   BILITOT 8.2* 8.0* 7.9*   ALKPHOS 154* 147* 143*   AST 2,655* 2,385* 2,195*   ALT 2,013* 1,900* 1,749*   ANIONGAP 6* 4* 5*   ESTGFRAFRICA >60 >60 >60   EGFRNONAA >60 >60 >60   , CBC   Recent Labs   Lab 10/29/20  1342 10/30/20  0659 10/31/20  0343   WBC 5.27 4.06 3.21*   HGB 11.9* 11.4* 10.9*    HCT 38.0 37.5 33.6*    123* 118*   , INR   Lab Results   Component Value Date    INR 1.4 (H) 10/31/2020    INR 1.6 (H) 10/30/2020    INR 1.4 (H) 10/30/2020    and All labs within the past 24 hours have been reviewed    Pending Diagnostic Studies:     Procedure Component Value Units Date/Time    Comprehensive metabolic panel [[id number]]     Order Status: Sent Lab Status: No result     Specimen: Blood     Protime-INR [[id number]]     Order Status: Sent Lab Status: No result     Specimen: Blood     Protime-INR [[id number]]     Order Status: Sent Lab Status: No result     Specimen: Blood     Protime-INR [[id number]]     Order Status: Sent Lab Status: No result     Specimen: Blood     Specimen to Pathology, Surgery Gastrointestinal tract [[id number]] Collected: 10/30/20 1039    Order Status: Sent Lab Status: In process Updated: 10/30/20 1618         Medications:  Reconciled Home Medications:      Medication List      You have not been prescribed any medications.         Indwelling Lines/Drains at time of discharge:   Lines/Drains/Airways     None                 Time spent on the discharge of patient: 65 minutes  Patient was seen and examined on the date of discharge and determined to be suitable for discharge.         Aparna Arrington NP  Department of Hospital Medicine  [facility] -

## 2020-11-02 ENCOUNTER — PATIENT OUTREACH (OUTPATIENT)
Dept: ADMINISTRATIVE | Facility: CLINIC | Age: 30
End: 2020-11-02

## 2020-11-03 ENCOUNTER — DOCUMENTATION ONLY (OUTPATIENT)
Dept: GASTROENTEROLOGY | Facility: CLINIC | Age: 30
End: 2020-11-03

## 2020-11-03 NOTE — PLAN OF CARE
11/03/20 0725   Final Note   Assessment Type Final Discharge Note   Anticipated Discharge Disposition Home   Right Care Referral Info   Post Acute Recommendation No Care

## 2020-11-04 LAB
BACTERIA BLD CULT: NORMAL
BACTERIA BLD CULT: NORMAL
FINAL PATHOLOGIC DIAGNOSIS: NORMAL
GROSS: NORMAL
Lab: NORMAL

## 2020-11-05 NOTE — PHYSICIAN QUERY
PT Name: Milagros Swan  MR #: [id number]    Pathology Findings Clarification     Lucia Guillen RN, CCDS; [email or web address]    This form is a permanent document in the medical record.     Query Date: November 5, 2020    By submitting this query, we are merely seeking further clarification of documentation.    Please utilize your independent clinical judgment when addressing the question(s) below.    The medical record contains the following:  Pathology Findings Location in Medical Record   1. DUODENUM, BIOPSY:   - Moderate active duodenitis with focal erosions   Comment : The differential diagnosis includes H. pylori related duodenitis,   drug/medication or other infectious causes. There is not evidence of changes   consistent with Celiac disease. Negative for dysplasia or malignancy.   2. STOMACH, BIOPSY:   - Gastric antral mucosa with moderate chronic gastritis   - Immunohistochemical stain is  positive  for  Helicobaster pylori  organisms   -  Negative  for intestinal metaplasia, dysplasia or malignancy     Interp By Jasmyne Sung M.D., Signed on 11/04/2020 at 10:22   Path report    Collected 10/30/2020     Please clarify:    [  ] Pathology findings noted above are ruled in/confirmed as diagnoses   [  ] Pathology findings noted above are not confirmed as diagnoses   [ X] Other diagnosis (please specify): Acute duodenitis 2/2 H. Pylori infection.   [  ] Clinically Undetermined     Please document in your progress notes daily for the duration of treatment until resolved and   include in your discharge summary.

## 2020-11-05 NOTE — PHYSICIAN QUERY
"PT Name: Milagros Swan  MR #: [id number]     ACUITY OF CONDITION CLARIFICATION    Lucia Guillen RN, CCDS; [email or web address]    This form is a permanent document in the medical record.     Query Date: November 5, 2020    By submitting this query, we are merely seeking further clarification of documentation to   reflect the severity of illness of your patient. Please utilize your independent clinical   judgment when addressing the question(s) below.    The Medical record reflects the following:   Indicators   Supporting Clinical Findings Location in Medical Record   x Documentation of condition EGD 10/30  Impression: - Normal second portion of the duodenum.  - Non-bleeding duodenal ulcers with no stigmata of  bleeding. Biopsied.  - Gastritis. Biopsied.  - Z-line regular, 38 cm from the incisors.  - No gross lesions in esophagus.  EGD Report 10/30    Lab Value(s)      Radiology Findings     x Treatment/Medication Recommendation:         - Return patient to hospital tai for ongoing care.   - Mechanical soft diet.   - Continue present medications.   - Use Protonix (pantoprazole) 40 mg PO BID.   - Check liver enzymes (AST, ALT, alkaline phosphatase, bilirubin) q 4 hours for one day.    - Check PT/INR q 4 hours for one day.    - The findings and recommendations were discussed with the referring physician, Dr. Dorsey.   - The findings and recommendations were discussed     ith the patient.  EGD Report 10/30    Other        Provider, please specify the acuity/chronicity of "duondenal ulcers"    [  X] Acute   [   ] Chronic   [   ] Acute on chronic   [   ] Other - specify: ____________   [   ]  Clinically Undetermined     Please document in your progress notes daily for the duration of treatment until resolved, and include in your discharge summary.  "

## 2021-01-07 ENCOUNTER — HOSPITAL ENCOUNTER (EMERGENCY)
Facility: HOSPITAL | Age: 31
Discharge: HOME | End: 2021-01-07
Attending: EMERGENCY MEDICINE
Payer: MEDICAID

## 2021-01-07 VITALS
BODY MASS INDEX: 28.84 KG/M2 | SYSTOLIC BLOOD PRESSURE: 126 MMHG | TEMPERATURE: 98 F | RESPIRATION RATE: 18 BRPM | DIASTOLIC BLOOD PRESSURE: 73 MMHG | HEIGHT: 62 IN | OXYGEN SATURATION: 100 % | HEART RATE: 84 BPM | WEIGHT: 156.75 LBS

## 2021-01-07 DIAGNOSIS — R07.81 RIB PAIN ON LEFT SIDE: ICD-10-CM

## 2021-01-07 DIAGNOSIS — S22.42XA CLOSED FRACTURE OF MULTIPLE RIBS OF LEFT SIDE, INITIAL ENCOUNTER: ICD-10-CM
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
## 2021-01-07 PROCEDURE — 96372 THER/PROPH/DIAG INJ SC/IM: CPT

## 2021-01-07 PROCEDURE — 63600175 PHARM REV CODE 636 W HCPCS: Performed by: NURSE PRACTITIONER

## 2021-01-07 PROCEDURE — 99284 EMERGENCY DEPT VISIT MOD MDM: CPT | Mod: 25

## 2021-01-07 PROCEDURE — 25000003 PHARM REV CODE 250: Performed by: NURSE PRACTITIONER

## 2021-01-07 RX ORDER — KETOROLAC TROMETHAMINE 30 MG/ML
30 INJECTION, SOLUTION INTRAMUSCULAR; INTRAVENOUS
Status: COMPLETED | OUTPATIENT
Start: 2021-01-07 | End: 2021-01-07

## 2021-01-07 RX ORDER — TRAMADOL HYDROCHLORIDE 50 MG/1
50 TABLET ORAL EVERY 8 HOURS PRN
Qty: 12 TABLET | Refills: 0 | Status: SHIPPED | OUTPATIENT
Start: 2021-01-07 | End: 2021-01-11

## 2021-01-07 RX ORDER — OXYCODONE HYDROCHLORIDE 5 MG/1
5 TABLET ORAL
Status: COMPLETED | OUTPATIENT
Start: 2021-01-07 | End: 2021-01-07

## 2021-01-07 RX ORDER — IBUPROFEN 400 MG/1
400 TABLET ORAL EVERY 4 HOURS
COMMUNITY
End: 2022-02-14 | Stop reason: DRUGHIGH

## 2021-01-07 RX ORDER — DICLOFENAC SODIUM 50 MG/1
50 TABLET, DELAYED RELEASE ORAL 3 TIMES DAILY PRN
Qty: 15 TABLET | Refills: 0 | Status: SHIPPED | OUTPATIENT
Start: 2021-01-07 | End: 2022-05-13

## 2021-01-07 RX ADMIN — OXYCODONE HYDROCHLORIDE 5 MG: 5 TABLET ORAL at 09:01

## 2021-01-07 RX ADMIN — KETOROLAC TROMETHAMINE 30 MG: 30 INJECTION, SOLUTION INTRAMUSCULAR at 09:01

## 2021-05-06 ENCOUNTER — PATIENT MESSAGE (OUTPATIENT)
Dept: RESEARCH | Facility: HOSPITAL | Age: 31
End: 2021-05-06

## 2021-07-01 ENCOUNTER — PATIENT MESSAGE (OUTPATIENT)
Dept: ADMINISTRATIVE | Facility: OTHER | Age: 31
End: 2021-07-01

## 2022-02-14 ENCOUNTER — HOSPITAL ENCOUNTER (EMERGENCY)
Facility: HOSPITAL | Age: 32
Discharge: HOME | End: 2022-02-14
Attending: EMERGENCY MEDICINE
Payer: MEDICAID

## 2022-02-14 VITALS
TEMPERATURE: 98 F | SYSTOLIC BLOOD PRESSURE: 124 MMHG | OXYGEN SATURATION: 99 % | DIASTOLIC BLOOD PRESSURE: 85 MMHG | HEART RATE: 86 BPM | RESPIRATION RATE: 18 BRPM

## 2022-02-14 DIAGNOSIS — M25.572 LEFT ANKLE PAIN: ICD-10-CM

## 2022-02-14 DIAGNOSIS — S93.402A SPRAIN OF LEFT ANKLE, UNSPECIFIED LIGAMENT, INITIAL ENCOUNTER: Primary | ICD-10-CM

## 2022-02-14 DIAGNOSIS — M79.672 LEFT FOOT PAIN: ICD-10-CM

## 2022-02-14 PROCEDURE — 99283 EMERGENCY DEPT VISIT LOW MDM: CPT | Mod: 25

## 2022-02-14 RX ORDER — IBUPROFEN 800 MG/1
800 TABLET ORAL EVERY 6 HOURS PRN
Qty: 20 TABLET | Refills: 0 | Status: SHIPPED | OUTPATIENT
Start: 2022-02-14 | End: 2022-05-13

## 2022-02-15 NOTE — FIRST PROVIDER EVALUATION
Medical screening exam completed.  I have conducted a focused provider triage encounter, findings are as follows:    Brief history of present illness:  Pt presents with c/o left foot and ankle pain after fall today.  Also c/o numbness and tingling to the left arm for months.    Vitals:    02/14/22 2053 02/14/22 2054   BP: (!) 125/94    Pulse: 91    Resp: 16    Temp:  97.7 °F (36.5 °C)   TempSrc:  Oral   SpO2: 100%        Pertinent physical exam:      Brief workup plan:      Preliminary workup initiated; this workup will be continued and followed by the physician or advanced practice provider that is assigned to the patient when roomed.

## 2022-02-15 NOTE — ED NOTES
Assumed care of pt for d/c only. Left ankle with swelling and mild pain per pt. Ace wrap applied to left ankle. D/C instructions and RX given to pt. Verbalized understanding.

## 2022-02-15 NOTE — ED PROVIDER NOTES
Encounter Date: 2022       History     Chief Complaint   Patient presents with    Foot Pain     Pt reports left foot pain and swelling after tripping over bricks today    Arm Pain     Pt reports left arm pain and numbness X months     Left foot and ankle pain after fall today.  Reports tripping over some bricks.  No meds taken for relief of symptoms.  No distress noted at this time.  Also c/o numbness and tingling to bilateral arms which has been going on for months.        Review of patient's allergies indicates:  No Known Allergies  Past Medical History:   Diagnosis Date    Hepatitis C     Substance abuse      Past Surgical History:   Procedure Laterality Date     SECTION      ESOPHAGOGASTRODUODENOSCOPY N/A 10/30/2020    Procedure: EGD (ESOPHAGOGASTRODUODENOSCOPY);  Surgeon: Livia Brown MD;  Location: [facility];  Service: Endoscopy;  Laterality: N/A;    TONSILLECTOMY       Family History   Problem Relation Age of Onset    Diabetes Mother     Diabetes Father     Cancer Maternal Grandmother     Cancer Maternal Grandfather      Social History     Tobacco Use    Smoking status: Current Every Day Smoker     Packs/day: 0.50     Years: 5.00     Pack years: 2.50     Types: Cigarettes    Smokeless tobacco: Current User   Substance Use Topics    Alcohol use: Not Currently    Drug use: Yes     Types: IV, Methamphetamines, Marijuana     Comment: Reports crystal meth use 10/29     Review of Systems   Constitutional: Negative for fever.   HENT: Negative for sore throat.    Respiratory: Negative for shortness of breath.    Cardiovascular: Negative for chest pain.   Gastrointestinal: Negative for nausea.   Genitourinary: Negative for dysuria.   Musculoskeletal: Positive for arthralgias (left ankle). Negative for back pain.   Skin: Negative for rash.   Neurological: Negative for weakness.   Hematological: Does not bruise/bleed easily.       Physical Exam     Initial Vitals   BP Pulse Resp Temp  SpO2   02/14/22 2053 02/14/22 2053 02/14/22 2053 02/14/22 2054 02/14/22 2053   (!) 125/94 91 16 97.7 °F (36.5 °C) 100 %      MAP       --                Physical Exam    Nursing note and vitals reviewed.  Constitutional: She appears well-developed and well-nourished.   HENT:   Head: Normocephalic and atraumatic.   Eyes: EOM are normal. Pupils are equal, round, and reactive to light.   Neck: Neck supple.   Normal range of motion.  Cardiovascular: Normal rate, regular rhythm, normal heart sounds and intact distal pulses.   Pulmonary/Chest: Breath sounds normal.   Abdominal: Abdomen is soft. Bowel sounds are normal.   Musculoskeletal:      Cervical back: Normal range of motion and neck supple.      Left ankle: Swelling present. No deformity. Tenderness present. Decreased range of motion.      Left foot: Swelling and bony tenderness present. No tenderness.     Neurological: She is alert and oriented to person, place, and time. She has normal strength and normal reflexes.   Skin: Skin is warm and dry.         ED Course   Procedures  Labs Reviewed - No data to display       Imaging Results          X-Ray Foot Complete Left (Final result)  Result time 02/14/22 22:06:47    Final result by Terry Coats MD (02/14/22 22:06:47)                 Impression:      No definite radiographic abnormality identified      Electronically signed by: Jorge L Stewart  Date:    02/14/2022  Time:    22:06             Narrative:    EXAMINATION:  XR FOOT COMPLETE 3 VIEW LEFT    CLINICAL HISTORY:  .  Pain in left foot    TECHNIQUE:  AP, lateral and oblique views of the left foot were performed.    COMPARISON:  None    FINDINGS:  No acute fracture or dislocation.  Joint spaces are unremarkable.  No definite soft tissue abnormality.  Normal bone mineral density.  No evidence for foreign body.  Minimal calcaneal spurring.                               X-Ray Ankle Complete Left (Final result)  Result time 02/14/22 22:05:47    Final result by Terry  MD Jorge L (02/14/22 22:05:47)                 Impression:      As above      Electronically signed by: Jorge L Stewart  Date:    02/14/2022  Time:    22:05             Narrative:    EXAMINATION:  XR ANKLE COMPLETE 3 VIEW LEFT    CLINICAL HISTORY:  XR ANKLE COMPLETE 3 VIEW LEFTPain in left ankle and joints of left foot    COMPARISON:  None    FINDINGS:  Multiple radiographic views  were obtained.    No definite fracture is identified.  There appears to be obliquity or limited positioning of the ankle joint                                 Medications - No data to display  Medical Decision Making:   ED Management:  I discussed with patient and/or family/caretaker that evaluation in the ED does not suggest any emergent or life threatening medical conditions requiring immediate intervention beyond what was provided in the ED, and I believe patient is safe for discharge. Regardless, an unremarkable evaluation in the ED does not preclude the development or presence of a serious of life threatening condition. As such, patient was instructed to return immediately for any worsening or change in current symptoms.                        Clinical Impression:   Final diagnoses:  [M25.572] Left ankle pain  [M79.672] Left foot pain  [S93.402A] Sprain of left ankle, unspecified ligament, initial encounter (Primary)          ED Disposition Condition    Discharge Stable        ED Prescriptions     Medication Sig Dispense Start Date End Date Auth. Provider    ibuprofen (ADVIL,MOTRIN) 800 MG tablet Take 1 tablet (800 mg total) by mouth every 6 (six) hours as needed for Pain. 20 tablet 2/14/2022  Kevin Brito NP        Follow-up Information     Follow up With Specialties Details Why Contact Info    Felisha Curtis NP Family Medicine  As needed [id number] [address] [address]  [phone]             Kevin Brito NP  02/17/22 2581

## 2022-05-13 ENCOUNTER — HOSPITAL ENCOUNTER (INPATIENT)
Facility: HOSPITAL | Age: 32
LOS: 2 days | Discharge: PSYCHIATRIC OR SUBSTANCE USE FACILITY | DRG: 917 | End: 2022-05-15
Attending: EMERGENCY MEDICINE | Admitting: INTERNAL MEDICINE
Payer: MEDICAID

## 2022-05-13 DIAGNOSIS — R45.1 PSYCHOMOTOR AGITATION: ICD-10-CM

## 2022-05-13 DIAGNOSIS — R94.31 QT PROLONGATION: ICD-10-CM

## 2022-05-13 DIAGNOSIS — T50.901A OVERDOSE: ICD-10-CM

## 2022-05-13 DIAGNOSIS — T50.901A ACCIDENTAL DRUG OVERDOSE, INITIAL ENCOUNTER: Primary | ICD-10-CM

## 2022-05-13 DIAGNOSIS — T50.914A: ICD-10-CM

## 2022-05-13 PROBLEM — R41.0 DRUG-INDUCED DELIRIUM: Status: ACTIVE | Noted: 2022-05-13

## 2022-05-13 PROBLEM — R10.84 GENERALIZED ABDOMINAL PAIN: Status: RESOLVED | Noted: 2020-10-29 | Resolved: 2022-05-13

## 2022-05-13 PROBLEM — G92.9 ENCEPHALOPATHY, TOXIC: Status: ACTIVE | Noted: 2022-05-13

## 2022-05-13 PROBLEM — T50.905A DRUG-INDUCED DELIRIUM: Status: ACTIVE | Noted: 2022-05-13

## 2022-05-13 PROBLEM — I10 HYPERTENSION: Status: ACTIVE | Noted: 2022-05-13

## 2022-05-13 PROBLEM — R45.851 SUICIDAL IDEATION: Status: ACTIVE | Noted: 2022-05-13

## 2022-05-13 PROBLEM — F17.200 SMOKER: Chronic | Status: ACTIVE | Noted: 2020-10-29

## 2022-05-13 LAB
ALBUMIN SERPL BCP-MCNC: 3.9 G/DL (ref 3.5–5.2)
ALP SERPL-CCNC: 71 U/L (ref 55–135)
ALT SERPL W/O P-5'-P-CCNC: 64 U/L (ref 10–44)
AMMONIA PLAS-SCNC: 25 UMOL/L (ref 10–50)
AMPHET+METHAMPHET UR QL: ABNORMAL
ANION GAP SERPL CALC-SCNC: 10 MMOL/L (ref 8–16)
AST SERPL-CCNC: 56 U/L (ref 10–40)
B-HCG UR QL: NEGATIVE
BARBITURATES UR QL SCN>200 NG/ML: NEGATIVE
BASOPHILS # BLD AUTO: 0 K/UL (ref 0–0.2)
BASOPHILS NFR BLD: 0 % (ref 0–1.9)
BENZODIAZ UR QL SCN>200 NG/ML: ABNORMAL
BILIRUB SERPL-MCNC: 0.7 MG/DL (ref 0.1–1)
BILIRUB UR QL STRIP: NEGATIVE
BUN SERPL-MCNC: 16 MG/DL (ref 6–20)
BZE UR QL SCN: ABNORMAL
CALCIUM SERPL-MCNC: 9.4 MG/DL (ref 8.7–10.5)
CANNABINOIDS UR QL SCN: ABNORMAL
CHLORIDE SERPL-SCNC: 108 MMOL/L (ref 95–110)
CK SERPL-CCNC: 416 U/L (ref 20–180)
CLARITY UR: CLEAR
CO2 SERPL-SCNC: 24 MMOL/L (ref 23–29)
COLOR UR: YELLOW
CREAT SERPL-MCNC: 0.7 MG/DL (ref 0.5–1.4)
CREAT UR-MCNC: 220.9 MG/DL (ref 15–325)
DIFFERENTIAL METHOD: ABNORMAL
EOSINOPHIL # BLD AUTO: 0.1 K/UL (ref 0–0.5)
EOSINOPHIL NFR BLD: 2 % (ref 0–8)
ERYTHROCYTE [DISTWIDTH] IN BLOOD BY AUTOMATED COUNT: 12.9 % (ref 11.5–14.5)
EST. GFR  (AFRICAN AMERICAN): >60 ML/MIN/1.73 M^2
EST. GFR  (NON AFRICAN AMERICAN): >60 ML/MIN/1.73 M^2
ETHANOL SERPL-MCNC: <10 MG/DL
GLUCOSE SERPL-MCNC: 91 MG/DL (ref 70–110)
GLUCOSE UR QL STRIP: NEGATIVE
HCT VFR BLD AUTO: 35.9 % (ref 37–48.5)
HGB BLD-MCNC: 11.9 G/DL (ref 12–16)
HGB UR QL STRIP: NEGATIVE
IMM GRANULOCYTES # BLD AUTO: 0.01 K/UL (ref 0–0.04)
IMM GRANULOCYTES NFR BLD AUTO: 0.2 % (ref 0–0.5)
INR PPP: 1.1 (ref 0.8–1.2)
KETONES UR QL STRIP: ABNORMAL
LEUKOCYTE ESTERASE UR QL STRIP: NEGATIVE
LYMPHOCYTES # BLD AUTO: 1.8 K/UL (ref 1–4.8)
LYMPHOCYTES NFR BLD: 39.9 % (ref 18–48)
MCH RBC QN AUTO: 28.3 PG (ref 27–31)
MCHC RBC AUTO-ENTMCNC: 33.1 G/DL (ref 32–36)
MCV RBC AUTO: 85 FL (ref 82–98)
METHADONE UR QL SCN>300 NG/ML: NEGATIVE
MONOCYTES # BLD AUTO: 0.4 K/UL (ref 0.3–1)
MONOCYTES NFR BLD: 8 % (ref 4–15)
NEUTROPHILS # BLD AUTO: 2.3 K/UL (ref 1.8–7.7)
NEUTROPHILS NFR BLD: 49.9 % (ref 38–73)
NITRITE UR QL STRIP: NEGATIVE
NRBC BLD-RTO: 0 /100 WBC
OPIATES UR QL SCN: NEGATIVE
PCP UR QL SCN>25 NG/ML: NEGATIVE
PH UR STRIP: 6 [PH] (ref 5–8)
PLATELET # BLD AUTO: 211 K/UL (ref 150–450)
PMV BLD AUTO: 9.7 FL (ref 9.2–12.9)
POTASSIUM SERPL-SCNC: 3.7 MMOL/L (ref 3.5–5.1)
PROT SERPL-MCNC: 7.8 G/DL (ref 6–8.4)
PROT UR QL STRIP: NEGATIVE
PROTHROMBIN TIME: 11.4 SEC (ref 9–12.5)
RBC # BLD AUTO: 4.21 M/UL (ref 4–5.4)
SARS-COV-2 RDRP RESP QL NAA+PROBE: NEGATIVE
SODIUM SERPL-SCNC: 142 MMOL/L (ref 136–145)
SP GR UR STRIP: 1.02 (ref 1–1.03)
TOXICOLOGY INFORMATION: ABNORMAL
URN SPEC COLLECT METH UR: ABNORMAL
UROBILINOGEN UR STRIP-ACNC: ABNORMAL EU/DL
WBC # BLD AUTO: 4.61 K/UL (ref 3.9–12.7)

## 2022-05-13 PROCEDURE — 96376 TX/PRO/DX INJ SAME DRUG ADON: CPT

## 2022-05-13 PROCEDURE — U0002 COVID-19 LAB TEST NON-CDC: HCPCS | Performed by: EMERGENCY MEDICINE

## 2022-05-13 PROCEDURE — 63600175 PHARM REV CODE 636 W HCPCS: Performed by: INTERNAL MEDICINE

## 2022-05-13 PROCEDURE — 96374 THER/PROPH/DIAG INJ IV PUSH: CPT

## 2022-05-13 PROCEDURE — 25000003 PHARM REV CODE 250: Performed by: EMERGENCY MEDICINE

## 2022-05-13 PROCEDURE — 85610 PROTHROMBIN TIME: CPT | Performed by: EMERGENCY MEDICINE

## 2022-05-13 PROCEDURE — 25000003 PHARM REV CODE 250: Performed by: NURSE PRACTITIONER

## 2022-05-13 PROCEDURE — 20000000 HC ICU ROOM

## 2022-05-13 PROCEDURE — 96375 TX/PRO/DX INJ NEW DRUG ADDON: CPT

## 2022-05-13 PROCEDURE — 25000003 PHARM REV CODE 250: Performed by: INTERNAL MEDICINE

## 2022-05-13 PROCEDURE — 99292 CRITICAL CARE ADDL 30 MIN: CPT

## 2022-05-13 PROCEDURE — 82077 ASSAY SPEC XCP UR&BREATH IA: CPT | Performed by: EMERGENCY MEDICINE

## 2022-05-13 PROCEDURE — 93010 EKG 12-LEAD: ICD-10-PCS | Mod: ,,, | Performed by: INTERNAL MEDICINE

## 2022-05-13 PROCEDURE — 82550 ASSAY OF CK (CPK): CPT | Performed by: EMERGENCY MEDICINE

## 2022-05-13 PROCEDURE — P9612 CATHETERIZE FOR URINE SPEC: HCPCS

## 2022-05-13 PROCEDURE — 93005 ELECTROCARDIOGRAM TRACING: CPT

## 2022-05-13 PROCEDURE — 93010 ELECTROCARDIOGRAM REPORT: CPT | Mod: ,,, | Performed by: INTERNAL MEDICINE

## 2022-05-13 PROCEDURE — 85025 COMPLETE CBC W/AUTO DIFF WBC: CPT | Performed by: EMERGENCY MEDICINE

## 2022-05-13 PROCEDURE — 25000003 PHARM REV CODE 250

## 2022-05-13 PROCEDURE — 99291 CRITICAL CARE FIRST HOUR: CPT | Mod: ,,, | Performed by: NURSE PRACTITIONER

## 2022-05-13 PROCEDURE — 63600175 PHARM REV CODE 636 W HCPCS: Performed by: EMERGENCY MEDICINE

## 2022-05-13 PROCEDURE — 81025 URINE PREGNANCY TEST: CPT | Performed by: EMERGENCY MEDICINE

## 2022-05-13 PROCEDURE — 80307 DRUG TEST PRSMV CHEM ANLYZR: CPT | Performed by: EMERGENCY MEDICINE

## 2022-05-13 PROCEDURE — 99291 CRITICAL CARE FIRST HOUR: CPT | Mod: 25

## 2022-05-13 PROCEDURE — 81003 URINALYSIS AUTO W/O SCOPE: CPT | Mod: 59 | Performed by: EMERGENCY MEDICINE

## 2022-05-13 PROCEDURE — C9399 UNCLASSIFIED DRUGS OR BIOLOG: HCPCS | Performed by: EMERGENCY MEDICINE

## 2022-05-13 PROCEDURE — 96372 THER/PROPH/DIAG INJ SC/IM: CPT | Performed by: EMERGENCY MEDICINE

## 2022-05-13 PROCEDURE — 99291 PR CRITICAL CARE, E/M 30-74 MINUTES: ICD-10-PCS | Mod: ,,, | Performed by: NURSE PRACTITIONER

## 2022-05-13 PROCEDURE — 82140 ASSAY OF AMMONIA: CPT | Performed by: EMERGENCY MEDICINE

## 2022-05-13 PROCEDURE — 96361 HYDRATE IV INFUSION ADD-ON: CPT

## 2022-05-13 PROCEDURE — C9399 UNCLASSIFIED DRUGS OR BIOLOG: HCPCS

## 2022-05-13 PROCEDURE — 80053 COMPREHEN METABOLIC PANEL: CPT | Performed by: EMERGENCY MEDICINE

## 2022-05-13 RX ORDER — LORAZEPAM 2 MG/ML
1 INJECTION INTRAMUSCULAR
Status: DISCONTINUED | OUTPATIENT
Start: 2022-05-13 | End: 2022-05-13

## 2022-05-13 RX ORDER — HYDRALAZINE HYDROCHLORIDE 20 MG/ML
10 INJECTION INTRAMUSCULAR; INTRAVENOUS EVERY 4 HOURS PRN
Status: DISCONTINUED | OUTPATIENT
Start: 2022-05-13 | End: 2022-05-15 | Stop reason: HOSPADM

## 2022-05-13 RX ORDER — HALOPERIDOL 5 MG/ML
5 INJECTION INTRAMUSCULAR EVERY 6 HOURS PRN
Status: DISCONTINUED | OUTPATIENT
Start: 2022-05-13 | End: 2022-05-15 | Stop reason: HOSPADM

## 2022-05-13 RX ORDER — BISACODYL 10 MG
10 SUPPOSITORY, RECTAL RECTAL DAILY PRN
Status: DISCONTINUED | OUTPATIENT
Start: 2022-05-13 | End: 2022-05-15 | Stop reason: HOSPADM

## 2022-05-13 RX ORDER — LORAZEPAM 2 MG/ML
1 INJECTION INTRAMUSCULAR
Status: COMPLETED | OUTPATIENT
Start: 2022-05-13 | End: 2022-05-13

## 2022-05-13 RX ORDER — ONDANSETRON 2 MG/ML
4 INJECTION INTRAMUSCULAR; INTRAVENOUS EVERY 8 HOURS PRN
Status: DISCONTINUED | OUTPATIENT
Start: 2022-05-13 | End: 2022-05-15 | Stop reason: HOSPADM

## 2022-05-13 RX ORDER — ACETAMINOPHEN 325 MG/1
650 TABLET ORAL EVERY 6 HOURS PRN
Status: DISCONTINUED | OUTPATIENT
Start: 2022-05-13 | End: 2022-05-15 | Stop reason: HOSPADM

## 2022-05-13 RX ORDER — DEXMEDETOMIDINE HYDROCHLORIDE 4 UG/ML
0-1.4 INJECTION INTRAVENOUS CONTINUOUS
Status: DISCONTINUED | OUTPATIENT
Start: 2022-05-13 | End: 2022-05-15

## 2022-05-13 RX ORDER — HALOPERIDOL 5 MG/ML
5 INJECTION INTRAMUSCULAR
Status: COMPLETED | OUTPATIENT
Start: 2022-05-13 | End: 2022-05-13

## 2022-05-13 RX ORDER — FAMOTIDINE 20 MG/50ML
20 INJECTION, SOLUTION INTRAVENOUS 2 TIMES DAILY
Status: DISCONTINUED | OUTPATIENT
Start: 2022-05-13 | End: 2022-05-13 | Stop reason: CLARIF

## 2022-05-13 RX ORDER — KETAMINE HYDROCHLORIDE 10 MG/ML
0.15 INJECTION, SOLUTION INTRAMUSCULAR; INTRAVENOUS
Status: COMPLETED | OUTPATIENT
Start: 2022-05-13 | End: 2022-05-13

## 2022-05-13 RX ORDER — LORAZEPAM 2 MG/ML
2 INJECTION INTRAMUSCULAR
Status: COMPLETED | OUTPATIENT
Start: 2022-05-13 | End: 2022-05-13

## 2022-05-13 RX ORDER — FAMOTIDINE 10 MG/ML
20 INJECTION INTRAVENOUS 2 TIMES DAILY
Status: DISCONTINUED | OUTPATIENT
Start: 2022-05-13 | End: 2022-05-15

## 2022-05-13 RX ORDER — SODIUM CHLORIDE 9 MG/ML
INJECTION, SOLUTION INTRAVENOUS CONTINUOUS
Status: DISCONTINUED | OUTPATIENT
Start: 2022-05-13 | End: 2022-05-15

## 2022-05-13 RX ORDER — DIPHENHYDRAMINE HYDROCHLORIDE 50 MG/ML
50 INJECTION INTRAMUSCULAR; INTRAVENOUS
Status: COMPLETED | OUTPATIENT
Start: 2022-05-13 | End: 2022-05-13

## 2022-05-13 RX ORDER — LORAZEPAM 2 MG/ML
2 INJECTION INTRAMUSCULAR EVERY 4 HOURS PRN
Status: DISCONTINUED | OUTPATIENT
Start: 2022-05-13 | End: 2022-05-13

## 2022-05-13 RX ORDER — LORAZEPAM 2 MG/ML
1.5 INJECTION INTRAMUSCULAR
Status: COMPLETED | OUTPATIENT
Start: 2022-05-13 | End: 2022-05-13

## 2022-05-13 RX ORDER — DEXMEDETOMIDINE HYDROCHLORIDE 4 UG/ML
INJECTION INTRAVENOUS
Status: COMPLETED
Start: 2022-05-13 | End: 2022-05-13

## 2022-05-13 RX ORDER — MUPIROCIN 20 MG/G
OINTMENT TOPICAL 2 TIMES DAILY
Status: DISCONTINUED | OUTPATIENT
Start: 2022-05-13 | End: 2022-05-15 | Stop reason: HOSPADM

## 2022-05-13 RX ORDER — LORAZEPAM 2 MG/ML
2 INJECTION INTRAMUSCULAR
Status: DISCONTINUED | OUTPATIENT
Start: 2022-05-13 | End: 2022-05-13

## 2022-05-13 RX ORDER — LORAZEPAM 2 MG/ML
2 INJECTION INTRAMUSCULAR
Status: DISCONTINUED | OUTPATIENT
Start: 2022-05-13 | End: 2022-05-14

## 2022-05-13 RX ADMIN — DIPHENHYDRAMINE HYDROCHLORIDE 50 MG: 50 INJECTION, SOLUTION INTRAMUSCULAR; INTRAVENOUS at 02:05

## 2022-05-13 RX ADMIN — LORAZEPAM 1 MG: 2 INJECTION INTRAMUSCULAR; INTRAVENOUS at 12:05

## 2022-05-13 RX ADMIN — LORAZEPAM 2 MG: 2 INJECTION INTRAMUSCULAR; INTRAVENOUS at 02:05

## 2022-05-13 RX ADMIN — LORAZEPAM 1 MG: 2 INJECTION INTRAMUSCULAR; INTRAVENOUS at 02:05

## 2022-05-13 RX ADMIN — DEXMEDETOMIDINE HYDROCHLORIDE 0.5 MCG/KG/HR: 4 INJECTION INTRAVENOUS at 04:05

## 2022-05-13 RX ADMIN — LORAZEPAM 2 MG: 2 INJECTION INTRAMUSCULAR; INTRAVENOUS at 07:05

## 2022-05-13 RX ADMIN — SODIUM CHLORIDE, SODIUM LACTATE, POTASSIUM CHLORIDE, AND CALCIUM CHLORIDE 1000 ML: .6; .31; .03; .02 INJECTION, SOLUTION INTRAVENOUS at 02:05

## 2022-05-13 RX ADMIN — FAMOTIDINE 20 MG: 10 INJECTION INTRAVENOUS at 09:05

## 2022-05-13 RX ADMIN — SODIUM CHLORIDE: 0.9 INJECTION, SOLUTION INTRAVENOUS at 07:05

## 2022-05-13 RX ADMIN — KETAMINE HYDROCHLORIDE 10.1 MG: 10 INJECTION, SOLUTION INTRAMUSCULAR; INTRAVENOUS at 03:05

## 2022-05-13 RX ADMIN — HALOPERIDOL LACTATE 5 MG: 5 INJECTION, SOLUTION INTRAMUSCULAR at 11:05

## 2022-05-13 RX ADMIN — LORAZEPAM 2 MG: 2 INJECTION INTRAMUSCULAR; INTRAVENOUS at 10:05

## 2022-05-13 RX ADMIN — LORAZEPAM 1.5 MG: 2 INJECTION INTRAMUSCULAR; INTRAVENOUS at 10:05

## 2022-05-13 RX ADMIN — MUPIROCIN: 20 OINTMENT TOPICAL at 09:05

## 2022-05-13 RX ADMIN — DEXMEDETOMIDINE HYDROCHLORIDE 1.4 MCG/KG/HR: 4 INJECTION INTRAVENOUS at 09:05

## 2022-05-13 NOTE — NURSING
Pt arrived to unit in bilateral upper and lower NVR  Pt transferred to ICU bed from ED bed with assist x4 people; pt was agitated and thrashed during transfer  Pt transferred with precedex at 1 mcg; precedex was titrated to 1.4 mcg in an attempt to achieve a RASS of 0  Chart and PEC arrived to unit with pt  1:1 observation maintained by this nurse

## 2022-05-13 NOTE — ED NOTES
Pt remains restless, kicking and attempting to get out of bed. MD notified. Soft ankle restraints re-applied.

## 2022-05-13 NOTE — ED NOTES
Pt remains restless, repeatedly sitting up in bed then lying back down, attempting to get out of bed. Seizure pads placed for pt safety. Security sitting with pt at this time.

## 2022-05-13 NOTE — H&P
[facility] - Intensive Care ([facility]  History & Physical    Patient Name: Milagros Swan  MRN: [id number]  Patient Class: IP- Inpatient  Admission Date: 2022  Attending Physician: Josh Soria MD   Primary Care Provider: Felisha Curtis NP         Patient information was obtained from parent and ER records.     Subjective:     Principal Problem:Encephalopathy, toxic    Chief Complaint:   Chief Complaint   Patient presents with    Drug Overdose     OD on unknown substance. Given 2mg Narcan IN prior to EMS arrival. Given 2mg Narcan IN by EMS en route. Pt arrived in restraints.         HPI: Ms. Swan was brought to the ED by EMS.  She was down and unresponsive at home.  She temporarily responded to Narcan but has been agitated and delirious.  Her mother was accompanying her at the bedside.  Her mother and father would be her surrogate decision makers as needed.  She has four young children and is unmarried.  According to her mother she has a long history of multiple substance abuse.  Drug of choice Methamphetamine.  About 2 months ago her significant other  of a drug overdose and she has been coping by increased drug use and suicidal thoughts.      Past Medical History:   Diagnosis Date    Hepatitis C     Substance abuse        Past Surgical History:   Procedure Laterality Date     SECTION      ESOPHAGOGASTRODUODENOSCOPY N/A 10/30/2020    Procedure: EGD (ESOPHAGOGASTRODUODENOSCOPY);  Surgeon: Livia Brown MD;  Location: [facility];  Service: Endoscopy;  Laterality: N/A;    TONSILLECTOMY         Review of patient's allergies indicates:  No Known Allergies    No current facility-administered medications on file prior to encounter.     Current Outpatient Medications on File Prior to Encounter   Medication Sig    [DISCONTINUED] diclofenac (VOLTAREN) 50 MG EC tablet Take 1 tablet (50 mg total) by mouth 3 (three) times daily as needed.    [DISCONTINUED]  ibuprofen (ADVIL,MOTRIN) 800 MG tablet Take 1 tablet (800 mg total) by mouth every 6 (six) hours as needed for Pain.    [DISCONTINUED] pantoprazole (PROTONIX) 40 MG tablet Take 1 tablet (40 mg total) by mouth 2 (two) times daily before meals. (Patient not taking: Reported on 11/2/2020)     Family History       Problem Relation (Age of Onset)    Cancer Maternal Grandmother, Maternal Grandfather    Diabetes Mother, Father          Tobacco Use    Smoking status: Current Every Day Smoker     Packs/day: 0.50     Years: 5.00     Pack years: 2.50     Types: Cigarettes    Smokeless tobacco: Current User   Substance and Sexual Activity    Alcohol use: Not Currently    Drug use: Yes     Types: IV, Methamphetamines, Marijuana     Comment: Reports crystal meth use 10/29    Sexual activity: Yes     Review of Systems   Unable to perform ROS: Mental status change   Objective:     Vital Signs (Most Recent):  Temp: 97.7 °F (36.5 °C) (05/13/22 1000)  Pulse: 83 (05/13/22 1800)  Resp: 20 (05/13/22 1800)  BP: (!) 144/90 (05/13/22 1800)  SpO2: 99 % (05/13/22 1800)   Vital Signs (24h Range):  Temp:  [97.7 °F (36.5 °C)] 97.7 °F (36.5 °C)  Pulse:  [] 83  Resp:  [20-23] 20  SpO2:  [97 %-100 %] 99 %  BP: (116-163)/(64-96) 144/90     Weight: 67 kg (147 lb 11.3 oz)  Body mass index is 27.02 kg/m².    Physical Exam  Vitals and nursing note reviewed.   Constitutional:       General: She is not in acute distress.     Appearance: She is well-developed.   HENT:      Head: Normocephalic and atraumatic.   Eyes:      Conjunctiva/sclera: Conjunctivae normal.      Comments: Pupils equal bilaterally.  <2mm.   Neck:      Thyroid: No thyromegaly.      Vascular: No JVD.   Cardiovascular:      Rate and Rhythm: Normal rate and regular rhythm.      Heart sounds: No murmur heard.    No friction rub. No gallop.   Pulmonary:      Effort: Pulmonary effort is normal.      Breath sounds: Normal breath sounds. No wheezing or rales.   Abdominal:       General: Bowel sounds are normal. There is no distension.      Palpations: Abdomen is soft.      Tenderness: There is no abdominal tenderness. There is no guarding or rebound.   Musculoskeletal:         General: No deformity. Normal range of motion.      Cervical back: Neck supple.   Lymphadenopathy:      Cervical: No cervical adenopathy.   Skin:     General: Skin is warm and dry.      Findings: Bruising present. No rash.      Comments: Scattered bruises to upper and lower extremities bilaterally.   Neurological:      Deep Tendon Reflexes: Reflexes are normal and symmetric.      Comments: Spontaneously moves all four extremities.  Severely agitated with minimal stimulation.  Not responding to questions or commands.           Significant Labs: All pertinent labs within the past 24 hours have been reviewed.    Significant Imaging: I have reviewed all pertinent imaging results/findings within the past 24 hours.    Assessment/Plan:     * Encephalopathy, toxic  Combined stimulant and narcotic overdose.  Psychotic agitation and she pulled out of restraint and off the gurney once in the ED.  Insufficient sedation with intermittent pushes of Lorazepam, Haloperidol, Ketamine, and Benadryl.  Started Precedex infusion on the ED.  Titrating up and will monitor closely in the ICU.  No significant derangements to blood counts or chemistries on the initial assessment.  Continuing IV fluid resuscitation and serial labwork.  Mildly hypertensive but otherwise hemodynamically and respiratory stable.    Precedex infusion with as needed Lorazepam and Haldol.    Drug overdose, multiple drugs, undetermined intent, initial encounter  Admission UDS positive for Opiate, Cocaine, Amphetamine, and THC.  She has been in drug use rehabilitation in the past.  Discuss further when appropriate.    Suicidal ideation  She has recently expressed intent to hurt herself per her mother.  In part related to the recent death of her significant other.   Tele-Psychiatry evaluation when appropriate.    Hypertension  Partly related to amphetamine/cocaine and agitation.  Adjusting sedation.  Hydralazine IV as needed.    Smoker   on cessation when appropriate.      VTE Risk Mitigation (From admission, onward)    None        Critical care time spent on the evaluation and treatment of severe organ dysfunction, review of pertinent labs and imaging studies, discussions with consulting providers and discussions with patient/family: 30 minutes.     Josh Soria MD  Department of Hospital Medicine   '[place] - Intensive Care ([facility])

## 2022-05-13 NOTE — HPI
Ms. Swan was brought to the ED by EMS.  She was down and unresponsive at home.  She temporarily responded to Narcan but has been agitated and delirious.  Her mother was accompanying her at the bedside.  Her mother and father would be her surrogate decision makers as needed.  She has four young children and is unmarried.  According to her mother she has a long history of multiple substance abuse.  Drug of choice Methamphetamine.  About 2 months ago her significant other  of a drug overdose and she has been coping by increased drug use and suicidal thoughts.

## 2022-05-13 NOTE — ED NOTES
Pt now kicking her legs, yelling - unable to understand what pt is saying. Pt escaped her right wrist restrained. Restrained reapplied and pt placed in soft ankle restraints as well per Dr. Lowry.

## 2022-05-13 NOTE — SUBJECTIVE & OBJECTIVE
Past Medical History:   Diagnosis Date    Hepatitis C     Substance abuse        Past Surgical History:   Procedure Laterality Date     SECTION      ESOPHAGOGASTRODUODENOSCOPY N/A 10/30/2020    Procedure: EGD (ESOPHAGOGASTRODUODENOSCOPY);  Surgeon: Livia Brown MD;  Location: [facility];  Service: Endoscopy;  Laterality: N/A;    TONSILLECTOMY         Review of patient's allergies indicates:  No Known Allergies    No current facility-administered medications on file prior to encounter.     Current Outpatient Medications on File Prior to Encounter   Medication Sig    [DISCONTINUED] diclofenac (VOLTAREN) 50 MG EC tablet Take 1 tablet (50 mg total) by mouth 3 (three) times daily as needed.    [DISCONTINUED] ibuprofen (ADVIL,MOTRIN) 800 MG tablet Take 1 tablet (800 mg total) by mouth every 6 (six) hours as needed for Pain.    [DISCONTINUED] pantoprazole (PROTONIX) 40 MG tablet Take 1 tablet (40 mg total) by mouth 2 (two) times daily before meals. (Patient not taking: Reported on 2020)     Family History       Problem Relation (Age of Onset)    Cancer Maternal Grandmother, Maternal Grandfather    Diabetes Mother, Father          Tobacco Use    Smoking status: Current Every Day Smoker     Packs/day: 0.50     Years: 5.00     Pack years: 2.50     Types: Cigarettes    Smokeless tobacco: Current User   Substance and Sexual Activity    Alcohol use: Not Currently    Drug use: Yes     Types: IV, Methamphetamines, Marijuana     Comment: Reports crystal meth use 10/29    Sexual activity: Yes     Review of Systems   Unable to perform ROS: Mental status change   Objective:     Vital Signs (Most Recent):  Temp: 97.7 °F (36.5 °C) (22 1000)  Pulse: 83 (22 1800)  Resp: 20 (22 1800)  BP: (!) 144/90 (22 1800)  SpO2: 99 % (22 1800)   Vital Signs (24h Range):  Temp:  [97.7 °F (36.5 °C)] 97.7 °F (36.5 °C)  Pulse:  [] 83  Resp:  [20-23] 20  SpO2:  [97 %-100 %] 99 %  BP: (116-163)/(64-96)  144/90     Weight: 67 kg (147 lb 11.3 oz)  Body mass index is 27.02 kg/m².    Physical Exam  Vitals and nursing note reviewed.   Constitutional:       General: She is not in acute distress.     Appearance: She is well-developed.   HENT:      Head: Normocephalic and atraumatic.   Eyes:      Conjunctiva/sclera: Conjunctivae normal.      Comments: Pupils equal bilaterally.  <2mm.   Neck:      Thyroid: No thyromegaly.      Vascular: No JVD.   Cardiovascular:      Rate and Rhythm: Normal rate and regular rhythm.      Heart sounds: No murmur heard.    No friction rub. No gallop.   Pulmonary:      Effort: Pulmonary effort is normal.      Breath sounds: Normal breath sounds. No wheezing or rales.   Abdominal:      General: Bowel sounds are normal. There is no distension.      Palpations: Abdomen is soft.      Tenderness: There is no abdominal tenderness. There is no guarding or rebound.   Musculoskeletal:         General: No deformity. Normal range of motion.      Cervical back: Neck supple.   Lymphadenopathy:      Cervical: No cervical adenopathy.   Skin:     General: Skin is warm and dry.      Findings: Bruising present. No rash.      Comments: Scattered bruises to upper and lower extremities bilaterally.   Neurological:      Deep Tendon Reflexes: Reflexes are normal and symmetric.      Comments: Spontaneously moves all four extremities.  Severely agitated with minimal stimulation.  Not responding to questions or commands.           Significant Labs: All pertinent labs within the past 24 hours have been reviewed.    Significant Imaging: I have reviewed all pertinent imaging results/findings within the past 24 hours.

## 2022-05-13 NOTE — ASSESSMENT & PLAN NOTE
Combined stimulant and narcotic overdose.  Psychotic agitation and she pulled out of restraint and off the gurney once in the ED.  Insufficient sedation with intermittent pushes of Lorazepam, Haloperidol, Ketamine, and Benadryl.  Started Precedex infusion on the ED.  Titrating up and will monitor closely in the ICU.  No significant derangements to blood counts or chemistries on the initial assessment.  Continuing IV fluid resuscitation and serial labwork.  Mildly hypertensive but otherwise hemodynamically and respiratory stable.    Precedex infusion with as needed Lorazepam and Haldol.

## 2022-05-13 NOTE — ASSESSMENT & PLAN NOTE
Partly related to amphetamine/cocaine and agitation.  Adjusting sedation.  Hydralazine IV as needed.

## 2022-05-13 NOTE — ED NOTES
Pt remains restless and confused. Moving BLE, ripped bilateral soft ankle restraints, but is not attempting to get out of bed at this time. VSS. WCTM.

## 2022-05-13 NOTE — PHARMACY MED REC
"Admission Medication History     The home medication history was taken by Jose Alberto Oliva.    You may go to "Admission" then "Reconcile Home Medications" tabs to review and/or act upon these items.      The home medication list has been updated by the Pharmacy department.    Please read ALL comments highlighted in yellow.    Please address this information as you see fit.     Feel free to contact us if you have any questions or require assistance.      The medications listed below were removed from the home medication list. Please reorder if appropriate:  Patient reports no longer taking the following medication(s):   VOLTAREN EC 50MG   IBUPROFEN 800MG   PROTONIX 40MG    Medications listed below were obtained from: Patient/family and Analytic software- [employer]  (Not in a hospital admission)      Jose Alberto Oliva  [id number]    Current Outpatient Medications on File Prior to Encounter   Medication Sig Dispense Refill Last Dose    [DISCONTINUED] diclofenac (VOLTAREN) 50 MG EC tablet Take 1 tablet (50 mg total) by mouth 3 (three) times daily as needed. 15 tablet 0     [DISCONTINUED] ibuprofen (ADVIL,MOTRIN) 800 MG tablet Take 1 tablet (800 mg total) by mouth every 6 (six) hours as needed for Pain. 20 tablet 0     [DISCONTINUED] pantoprazole (PROTONIX) 40 MG tablet Take 1 tablet (40 mg total) by mouth 2 (two) times daily before meals. (Patient not taking: Reported on 11/2/2020) 60 tablet 0                              .          "

## 2022-05-13 NOTE — CONSULTS
[facility] - Emergency Dept.  Critical Care Medicine  Consult Note    Patient Name: Milagros Swan  MRN: [id number]  Admission Date: 2022  Hospital Length of Stay: 0 days  Code Status: Prior  Attending Physician: Anival Lowry MD   Primary Care Provider: Felisha Curtis NP   Principal Problem: Drug-induced delirium    [unfilled]  Subjective:     HPI:  Ms Sawn is a 33 yo female with a PMH of Hep C, Polysubstance abuse/dependence and Tobacco abuse.  She was found unresponsive and EMS responded where patient awakened with IN Narcan and presented to [facility] ED at 1100 hr.  Mother contacted stated patient has been IVDA for 10 years and has been suicidal lately since boyfriend  last month of overdose.  In ED worsening delirium trying to get OOB restless with kicking, yelling, mumbled speech, confused and ripping off soft wrist restraints minimal improvement with Haldol and Ativan.  Patient PECd in ED and admitting to ICU on Precedex infusion.       Hospital/ICU Course:   - Patient seen in ED with Dr. Soria awaiting ICU bed.  She is supine on IVFs and Precedex infusion mostly calm but with any stimuli gets agitated moaning and kicking in bed.  No resp distress and VSS with mild HTN but stable oxygenation.       Past Medical History:   Diagnosis Date    Hepatitis C     Substance abuse        Past Surgical History:   Procedure Laterality Date     SECTION      ESOPHAGOGASTRODUODENOSCOPY N/A 10/30/2020    Procedure: EGD (ESOPHAGOGASTRODUODENOSCOPY);  Surgeon: Livia Brown MD;  Location: [facility];  Service: Endoscopy;  Laterality: N/A;    TONSILLECTOMY         Review of patient's allergies indicates:  No Known Allergies    Family History       Problem Relation (Age of Onset)    Cancer Maternal Grandmother, Maternal Grandfather    Diabetes Mother, Father          Tobacco Use    Smoking status: Current Every Day Smoker     Packs/day: 0.50     Years: 5.00     Pack years: 2.50      Types: Cigarettes    Smokeless tobacco: Current User   Substance and Sexual Activity    Alcohol use: Not Currently    Drug use: Yes     Types: IV, Methamphetamines, Marijuana     Comment: Reports crystal meth use 10/29    Sexual activity: Yes         Review of Systems   Unable to perform ROS: Mental status change   Objective:     Vital Signs (Most Recent):  Temp: 97.7 °F (36.5 °C) (05/13/22 1000)  Pulse: 76 (05/13/22 1600)  Resp: (!) 21 (05/13/22 1600)  BP: (!) 163/83 (05/13/22 1600)  SpO2: 98 % (05/13/22 1600)   Vital Signs (24h Range):  Temp:  [97.7 °F (36.5 °C)] 97.7 °F (36.5 °C)  Pulse:  [] 76  Resp:  [20-22] 21  SpO2:  [98 %-100 %] 98 %  BP: (125-163)/(64-85) 163/83     Weight: 67 kg (147 lb 11.3 oz)  Body mass index is 27.02 kg/m².      Intake/Output Summary (Last 24 hours) at 5/13/2022 1746  Last data filed at 5/13/2022 1728  Gross per 24 hour   Intake 1012.41 ml   Output --   Net 1012.41 ml       Physical Exam  Vitals and nursing note reviewed.   Constitutional:       General: She is not in acute distress.     Appearance: She is well-developed. She is ill-appearing. She is not toxic-appearing or diaphoretic.      Interventions: She is restrained. She is not intubated.  HENT:      Head: Normocephalic and atraumatic.      Mouth/Throat:      Mouth: Mucous membranes are moist.   Eyes:      General: Lids are normal.      Comments: Pupils pinpoint   Neck:      Trachea: Trachea normal.   Cardiovascular:      Rate and Rhythm: Normal rate and regular rhythm.      Pulses: Normal pulses.           Radial pulses are 2+ on the right side and 2+ on the left side.        Dorsalis pedis pulses are 2+ on the right side and 2+ on the left side.      Heart sounds: Normal heart sounds.   Pulmonary:      Effort: Pulmonary effort is normal. No tachypnea, accessory muscle usage or respiratory distress. She is not intubated.      Breath sounds: Normal breath sounds.   Chest:      Chest wall: No deformity or  tenderness.   Abdominal:      General: Bowel sounds are decreased. There is no distension.      Palpations: Abdomen is soft.      Tenderness: There is no abdominal tenderness.   Musculoskeletal:         General: Normal range of motion.      Cervical back: Normal range of motion.      Right lower leg: No edema.      Left lower leg: No edema.      Right foot: No deformity.      Left foot: No deformity.   Lymphadenopathy:      Cervical: No cervical adenopathy.   Skin:     General: Skin is warm and dry.      Capillary Refill: Capillary refill takes less than 2 seconds.      Findings: No rash.   Neurological:      General: No focal deficit present.      Mental Status: She is confused.      GCS: GCS eye subscore is 2. GCS verbal subscore is 2. GCS motor subscore is 5.      Comments: Mumbled sounds with moaning and starts kicking and thrashing with any stimuli on Precedex infusion.     Psychiatric:         Attention and Perception: She is inattentive.         Mood and Affect: Affect is inappropriate.         Speech: She is noncommunicative.         Behavior: Behavior is uncooperative, agitated and combative.         Thought Content: Thought content normal.         Judgment: Judgment is inappropriate.       Lines/Drains/Airways       Peripheral Intravenous Line  Duration                  Peripheral IV - Single Lumen 05/13/22 1012 18 G Left Antecubital <1 day                    Significant Labs:    CBC/Anemia Profile:  Recent Labs   Lab 05/13/22  1026   WBC 4.61   HGB 11.9*   HCT 35.9*      MCV 85   RDW 12.9        Chemistries:  Recent Labs   Lab 05/13/22  1026      K 3.7      CO2 24   BUN 16   CREATININE 0.7   CALCIUM 9.4   ALBUMIN 3.9   PROT 7.8   BILITOT 0.7   ALKPHOS 71   ALT 64*   AST 56*       Urine Studies:   Recent Labs   Lab 05/13/22  1532   COLORU Yellow   APPEARANCEUA Clear   PHUR 6.0   SPECGRAV 1.025   PROTEINUA Negative   GLUCUA Negative   KETONESU 1+*   BILIRUBINUA Negative   OCCULTUA  Negative   NITRITE Negative   UROBILINOGEN 2.0-3.0*   LEUKOCYTESUR Negative     All pertinent labs within the past 24 hours have been reviewed.        Assessment/Plan:     Neuro  * Drug-induced delirium  Cont IVFs, soft wrist restraints and Precedex infusion  Sitter at bed side    Other  Smoker  Will encourage tobacco cessation once fully awake and alert    Drug overdose, multiple drugs, undetermined intent, initial encounter  Long hx polysubstance abuse including IVDA for 10 years per mother  UDS + BZD, Amphetamines, THC and Cocaine  Cont IVFs  Mother thinks possibly suicidal  PECd with sitter at bed side  Suicide precautions  Consult  in AM for placement options once medically cleared  Explore suicidal ideation once fully awake, alert and cooperative  Will encourage illicit drug cessation once fully awake and alert       Preventive Measures and Monitoring:   Stress Ulcer: Pepcid  Nutrition: regular diet in AM once awake, alert and cooperative  Glucose control: stable  Bowel prophylaxis: PRN Dulcolax  DVT prophylaxis: SCDs only.  No anticoagulation given kicking and thrashing already with bruising  Hx CAD on B-Blocker: no hx CAD  Head of Bed/Reposition: Elevate HOB and turn Q1-2 hours   Early Mobility: bed rest  Code Status: Full    Counseling/Consultation:I have discussed the care of this patient in detail with the bedside nursing staff and Dr. Pace and Dr. Soria    Critical Care Time: 48 minutes  Critical secondary to Patient has a condition that poses threat to life and bodily function: Drug induced psychotic delirium  Patient has an abrupt change in neurologic status: Drug OD  Patient is currently on drug therapy requiring intensive monitoring for toxicity: Precedex infusion     Critical care was time spent personally by me on the following activities: development of treatment plan with patient or surrogate and bedside caregivers, discussions with consultants, evaluation of patient's  response to treatment, examination of patient, ordering and performing treatments and interventions, ordering and review of laboratory studies, ordering and review of radiographic studies, pulse oximetry, re-evaluation of patient's condition. This critical care time did not overlap with that of any other provider or involve time for any procedures.    Thank you for your consult. I will follow-up with patient. Please contact us if you have any additional questions.     Mychal Mckeon NP  Critical Care Medicine  [facility] - Emergency Dept.

## 2022-05-13 NOTE — HPI
Ms Swan is a 31 yo female with a PMH of Hep C, Polysubstance abuse/dependence and Tobacco abuse.  She was found unresponsive and EMS responded where patient awakened with IN Narcan and presented to [facility] ED at 1100 hr.  Mother contacted stated patient has been IVDA for 10 years and has been suicidal lately since boyfriend  last month of overdose.  In ED worsening delirium trying to get OOB restless with kicking, yelling, mumbled speech, confused and ripping off soft wrist restraints minimal improvement with Haldol and Ativan.  Patient PECd in ED and admitting to ICU on Precedex infusion.

## 2022-05-13 NOTE — SUBJECTIVE & OBJECTIVE
Past Medical History:   Diagnosis Date    Hepatitis C     Substance abuse        Past Surgical History:   Procedure Laterality Date     SECTION      ESOPHAGOGASTRODUODENOSCOPY N/A 10/30/2020    Procedure: EGD (ESOPHAGOGASTRODUODENOSCOPY);  Surgeon: Livia Brown MD;  Location: [facility];  Service: Endoscopy;  Laterality: N/A;    TONSILLECTOMY         Review of patient's allergies indicates:  No Known Allergies    Family History       Problem Relation (Age of Onset)    Cancer Maternal Grandmother, Maternal Grandfather    Diabetes Mother, Father          Tobacco Use    Smoking status: Current Every Day Smoker     Packs/day: 0.50     Years: 5.00     Pack years: 2.50     Types: Cigarettes    Smokeless tobacco: Current User   Substance and Sexual Activity    Alcohol use: Not Currently    Drug use: Yes     Types: IV, Methamphetamines, Marijuana     Comment: Reports crystal meth use 10/29    Sexual activity: Yes         Review of Systems   Unable to perform ROS: Mental status change   Objective:     Vital Signs (Most Recent):  Temp: 97.7 °F (36.5 °C) (22 1000)  Pulse: 76 (22 1600)  Resp: (!) 21 (22 1600)  BP: (!) 163/83 (22 1600)  SpO2: 98 % (22 1600)   Vital Signs (24h Range):  Temp:  [97.7 °F (36.5 °C)] 97.7 °F (36.5 °C)  Pulse:  [] 76  Resp:  [20-22] 21  SpO2:  [98 %-100 %] 98 %  BP: (125-163)/(64-85) 163/83     Weight: 67 kg (147 lb 11.3 oz)  Body mass index is 27.02 kg/m².      Intake/Output Summary (Last 24 hours) at 2022 1746  Last data filed at 2022 1728  Gross per 24 hour   Intake 1012.41 ml   Output --   Net 1012.41 ml       Physical Exam  Vitals and nursing note reviewed.   Constitutional:       General: She is not in acute distress.     Appearance: She is well-developed. She is ill-appearing. She is not toxic-appearing or diaphoretic.      Interventions: She is restrained. She is not intubated.  HENT:      Head: Normocephalic and atraumatic.       Mouth/Throat:      Mouth: Mucous membranes are moist.   Eyes:      General: Lids are normal.      Comments: Pupils pinpoint   Neck:      Trachea: Trachea normal.   Cardiovascular:      Rate and Rhythm: Normal rate and regular rhythm.      Pulses: Normal pulses.           Radial pulses are 2+ on the right side and 2+ on the left side.        Dorsalis pedis pulses are 2+ on the right side and 2+ on the left side.      Heart sounds: Normal heart sounds.   Pulmonary:      Effort: Pulmonary effort is normal. No tachypnea, accessory muscle usage or respiratory distress. She is not intubated.      Breath sounds: Normal breath sounds.   Chest:      Chest wall: No deformity or tenderness.   Abdominal:      General: Bowel sounds are decreased. There is no distension.      Palpations: Abdomen is soft.      Tenderness: There is no abdominal tenderness.   Musculoskeletal:         General: Normal range of motion.      Cervical back: Normal range of motion.      Right lower leg: No edema.      Left lower leg: No edema.      Right foot: No deformity.      Left foot: No deformity.   Lymphadenopathy:      Cervical: No cervical adenopathy.   Skin:     General: Skin is warm and dry.      Capillary Refill: Capillary refill takes less than 2 seconds.      Findings: No rash.   Neurological:      General: No focal deficit present.      Mental Status: She is confused.      GCS: GCS eye subscore is 2. GCS verbal subscore is 2. GCS motor subscore is 5.      Comments: Mumbled sounds with moaning and starts kicking and thrashing with any stimuli on Precedex infusion.     Psychiatric:         Attention and Perception: She is inattentive.         Mood and Affect: Affect is inappropriate.         Speech: She is noncommunicative.         Behavior: Behavior is uncooperative, agitated and combative.         Thought Content: Thought content normal.         Judgment: Judgment is inappropriate.       Lines/Drains/Airways       Peripheral Intravenous  Line  Duration                  Peripheral IV - Single Lumen 05/13/22 1012 18 G Left Antecubital <1 day                    Significant Labs:    CBC/Anemia Profile:  Recent Labs   Lab 05/13/22  1026   WBC 4.61   HGB 11.9*   HCT 35.9*      MCV 85   RDW 12.9        Chemistries:  Recent Labs   Lab 05/13/22  1026      K 3.7      CO2 24   BUN 16   CREATININE 0.7   CALCIUM 9.4   ALBUMIN 3.9   PROT 7.8   BILITOT 0.7   ALKPHOS 71   ALT 64*   AST 56*       Urine Studies:   Recent Labs   Lab 05/13/22  1532   COLORU Yellow   APPEARANCEUA Clear   PHUR 6.0   SPECGRAV 1.025   PROTEINUA Negative   GLUCUA Negative   KETONESU 1+*   BILIRUBINUA Negative   OCCULTUA Negative   NITRITE Negative   UROBILINOGEN 2.0-3.0*   LEUKOCYTESUR Negative     All pertinent labs within the past 24 hours have been reviewed.

## 2022-05-13 NOTE — HOSPITAL COURSE
5/13 - Patient seen in ED with Dr. Soria awaiting ICU bed.  She is supine on IVFs and Precedex infusion mostly calm but with any stimuli gets agitated moaning and kicking in bed.  No resp distress and VSS with mild HTN but stable oxygenation.   5/14 - semi erect in bed.  Stopped Precedex infusion this AM due to bradycardia in 40s and at times in upper 30s.  Slowly awakening still confused with mumbled speech but orientation to person and still periods of mild restlessness but no respiratory distress.  HR improving off Precedex infusion  5/15 - Upright in bed calm and cooperative off Precedex infusion in no distress with VSS and RA %

## 2022-05-13 NOTE — ED NOTES
Pt belongings  Black bottoms   Orange pull over  1 pair of converses  2 Pink bra  Purple shorts  Gray shirt  Purple jeans  Pair of socks  Black underwear  Purple jeans  Black shorts  Pink and purple leopard bra  Black clutch  Dish detergent  Facial serum  tampoons  A bag of wet clothes  A big hook/lock

## 2022-05-13 NOTE — ED NOTES
Witnessed pt stand on bed and climb over side rail to exit bed. Pt remains confused and restless. Pt did not fall when exiting bed, assisted pt back to bed, soft wrist restraints applied per order.

## 2022-05-13 NOTE — ASSESSMENT & PLAN NOTE
She has recently expressed intent to hurt herself per her mother.  In part related to the recent death of her significant other.  Tele-Psychiatry evaluation when appropriate.

## 2022-05-13 NOTE — ED NOTES
Mother at bedside. Reports pt has been using IV drugs x10 years. Her boyfriend  last month of an OD and pt has been having SI per mom. Dr. Lowry notified.

## 2022-05-13 NOTE — ASSESSMENT & PLAN NOTE
Long hx polysubstance abuse including IVDA for 10 years per mother  UDS + BZD, Amphetamines, THC and Cocaine  Cont IVFs  Mother thinks possibly suicidal  PECd with sitter at bed side  Suicide precautions  Consult  in AM for placement options once medically cleared  Explore suicidal ideation once fully awake, alert and cooperative  Will encourage illicit drug cessation once fully awake and alert

## 2022-05-13 NOTE — ASSESSMENT & PLAN NOTE
Admission UDS positive for Opiate, Cocaine, Amphetamine, and THC.  She has been in drug use rehabilitation in the past.  Discuss further when appropriate.

## 2022-05-13 NOTE — ED PROVIDER NOTES
SCRIBE #1 NOTE: I, Izabela Booth, am scribing for, and in the presence of, Anival Lowry MD. I have scribed the entire note.       History     Chief Complaint   Patient presents with    Drug Overdose     OD on unknown substance. Given 2mg Narcan IN prior to EMS arrival. Given 2mg Narcan IN by EMS en route. Pt arrived in restraints.      Review of patient's allergies indicates:  No Known Allergies      History of Present Illness     HPI is limited due to patient's clinical condition    2022, 10:57 AM  History obtained from the patient and EMS      History of Present Illness: Milagros Swan is a 32 y.o. female patient with a PMHx of drug usage who presents to the Emergency Department for evaluation of drug overdose. Patient used meth but cannot remember if she used another substance. Patient was initially unresponsive but woke with 2 mg of Narcan IN by EMS en route. Patient arrived to ED in restraints. Symptoms are constant and moderate in severity. No mitigating or exacerbating factors reported. No further complaints or concerns at this time.       Arrival mode: Personal vehicle     PCP: Felisha Curtis NP        Past Medical History:  Past Medical History:   Diagnosis Date    Hepatitis C     Hypertension 2022    Substance abuse        Past Surgical History:  Past Surgical History:   Procedure Laterality Date     SECTION      ESOPHAGOGASTRODUODENOSCOPY N/A 10/30/2020    Procedure: EGD (ESOPHAGOGASTRODUODENOSCOPY);  Surgeon: Livia Brown MD;  Location: [facility];  Service: Endoscopy;  Laterality: N/A;    TONSILLECTOMY           Family History:  Family History   Problem Relation Age of Onset    Diabetes Mother     Diabetes Father     Cancer Maternal Grandmother     Cancer Maternal Grandfather        Social History:  Social History     Tobacco Use    Smoking status: Current Every Day Smoker     Packs/day: 0.50     Years: 5.00     Pack years: 2.50     Types: Cigarettes     Smokeless tobacco: Current User   Substance and Sexual Activity    Alcohol use: Not Currently    Drug use: Yes     Types: IV, Methamphetamines, Marijuana     Comment: Reports crystal meth use 10/29    Sexual activity: Yes        Review of Systems     Review of Systems is limited due to patient's clinical condition.     Physical Exam     Initial Vitals [05/13/22 1000]   BP Pulse Resp Temp SpO2   125/80 80 20 97.7 °F (36.5 °C) 100 %      MAP       --          Physical Exam  Nursing Notes and Vital Signs Reviewed.  Constitutional: Patient is in mild distress. Patient is jumping and moaning. Patient is scratching sporadically. Patient is mildly diaphoretic. Well-developed and well-nourished.  Head: Atraumatic. Normocephalic.  Eyes: Pupils are dilated and reactive.EOM intact. Conjunctivae are not pale. No scleral icterus.  ENT: Mucous membranes are moist. Oropharynx is clear and symmetric.    Neck: Supple. Full ROM.   Cardiovascular: Regular rate. Regular rhythm. No murmurs, rubs, or gallops. Distal pulses are 2+ and symmetric.  Pulmonary/Chest: No respiratory distress. Clear to auscultation bilaterally. No wheezing or rales.  Abdominal: Soft and non-distended.  There is no tenderness.  No rebound, guarding, or rigidity.  Musculoskeletal: Moves all extremities. No obvious deformities. No edema.   Skin: Warm and dry. Skin markings.   Neurological:  Alert, awake, and appropriate.  Normal speech.  No acute focal neurological deficits are appreciated.  Psychiatric: Psychomotor agitation. Patient refuses to answer oriented questions. Difficult to redirect.     ED Course   Critical Care    Date/Time: 5/13/2022 11:32 AM  Performed by: Anival Lowry MD  Authorized by: Anival Lowry MD   Direct patient critical care time: 48 minutes  Additional history critical care time: 17 minutes  Ordering / reviewing critical care time: 3 minutes  Documentation critical care time: 7 minutes  Consulting other physicians  critical care time: 4 minutes  Consult with family critical care time: 4 minutes  Total critical care time (exclusive of procedural time) : 83 minutes  Critical care time was exclusive of separately billable procedures and treating other patients and teaching time.  Critical care was necessary to treat or prevent imminent or life-threatening deterioration of the following conditions: toxidrome.  Critical care was time spent personally by me on the following activities: blood draw for specimens, development of treatment plan with patient or surrogate, discussions with consultants, interpretation of cardiac output measurements, evaluation of patient's response to treatment, examination of patient, obtaining history from patient or surrogate, ordering and performing treatments and interventions, ordering and review of laboratory studies, pulse oximetry, ordering and review of radiographic studies, re-evaluation of patient's condition and review of old charts.        ED Vital Signs:  Vitals:    05/14/22 2305 05/14/22 2345 05/15/22 0000 05/15/22 0030   BP:  (!) 142/67 133/85 (!) 152/84   Pulse:  74 78 86   Resp:  (!) 22 (!) 22 (!) 24   Temp: 97 °F (36.1 °C)      TempSrc: Temporal      SpO2:  100% 100% 100%   Weight:       Height:        05/15/22 0100 05/15/22 0200 05/15/22 0230 05/15/22 0300   BP: 129/77 128/81 117/81 138/88   Pulse: 69 76 70 68   Resp: (!) 22 (!) 31 (!) 21 (!) 23   Temp:       TempSrc:       SpO2: 100% 100% 100% 100%   Weight:       Height:        05/15/22 0305 05/15/22 0400 05/15/22 0500 05/15/22 0600   BP:  (!) 139/92 136/75 (!) 145/85   Pulse:  76 63 78   Resp:  (!) 22 (!) 28 (!) 23   Temp: 98.2 °F (36.8 °C)      TempSrc: Temporal      SpO2:  100% 100% 100%   Weight:   67.9 kg (149 lb 11.1 oz)    Height:        05/15/22 0700 05/15/22 0800 05/15/22 0900   BP: 127/69 128/76 136/82   Pulse: 84 71 91   Resp: (!) 38 (!) 23 (!) 22   Temp: 98.3 °F (36.8 °C)     TempSrc: Oral     SpO2: 100% 100% 98%    Weight:      Height:          Abnormal Lab Results:  Labs Reviewed   CBC W/ AUTO DIFFERENTIAL - Abnormal; Notable for the following components:       Result Value    Hemoglobin 11.9 (*)     Hematocrit 35.9 (*)     All other components within normal limits   COMPREHENSIVE METABOLIC PANEL - Abnormal; Notable for the following components:    AST 56 (*)     ALT 64 (*)     All other components within normal limits   URINALYSIS, REFLEX TO URINE CULTURE - Abnormal; Notable for the following components:    Ketones, UA 1+ (*)     Urobilinogen, UA 2.0-3.0 (*)     All other components within normal limits    Narrative:     Specimen Source->Urine   DRUG SCREEN PANEL, URINE EMERGENCY - Abnormal; Notable for the following components:    Benzodiazepines Presumptive Positive (*)     Cocaine (Metab.) Presumptive Positive (*)     Amphetamine Screen, Ur Presumptive Positive (*)     THC Presumptive Positive (*)     All other components within normal limits    Narrative:     Specimen Source->Urine   CK - Abnormal; Notable for the following components:     (*)     All other components within normal limits   AMMONIA   PROTIME-INR   ALCOHOL,MEDICAL (ETHANOL)   PREGNANCY TEST, URINE RAPID    Narrative:     Specimen Source->Urine   SARS-COV-2 RNA AMPLIFICATION, QUAL        All Lab Results:  Results for orders placed or performed during the hospital encounter of 05/13/22   CBC auto differential   Result Value Ref Range    WBC 4.61 3.90 - 12.70 K/uL    RBC 4.21 4.00 - 5.40 M/uL    Hemoglobin 11.9 (L) 12.0 - 16.0 g/dL    Hematocrit 35.9 (L) 37.0 - 48.5 %    MCV 85 82 - 98 fL    MCH 28.3 27.0 - 31.0 pg    MCHC 33.1 32.0 - 36.0 g/dL    RDW 12.9 11.5 - 14.5 %    Platelets 211 150 - 450 K/uL    MPV 9.7 9.2 - 12.9 fL    Immature Granulocytes 0.2 0.0 - 0.5 %    Gran # (ANC) 2.3 1.8 - 7.7 K/uL    Immature Grans (Abs) 0.01 0.00 - 0.04 K/uL    Lymph # 1.8 1.0 - 4.8 K/uL    Mono # 0.4 0.3 - 1.0 K/uL    Eos # 0.1 0.0 - 0.5 K/uL    Baso # 0.00  0.00 - 0.20 K/uL    nRBC 0 0 /100 WBC    Gran % 49.9 38.0 - 73.0 %    Lymph % 39.9 18.0 - 48.0 %    Mono % 8.0 4.0 - 15.0 %    Eosinophil % 2.0 0.0 - 8.0 %    Basophil % 0.0 0.0 - 1.9 %    Differential Method Automated    Comprehensive metabolic panel   Result Value Ref Range    Sodium 142 136 - 145 mmol/L    Potassium 3.7 3.5 - 5.1 mmol/L    Chloride 108 95 - 110 mmol/L    CO2 24 23 - 29 mmol/L    Glucose 91 70 - 110 mg/dL    BUN 16 6 - 20 mg/dL    Creatinine 0.7 0.5 - 1.4 mg/dL    Calcium 9.4 8.7 - 10.5 mg/dL    Total Protein 7.8 6.0 - 8.4 g/dL    Albumin 3.9 3.5 - 5.2 g/dL    Total Bilirubin 0.7 0.1 - 1.0 mg/dL    Alkaline Phosphatase 71 55 - 135 U/L    AST 56 (H) 10 - 40 U/L    ALT 64 (H) 10 - 44 U/L    Anion Gap 10 8 - 16 mmol/L    eGFR if African American >60 >60 mL/min/1.73 m^2    eGFR if non African American >60 >60 mL/min/1.73 m^2   Ammonia   Result Value Ref Range    Ammonia 25 10 - 50 umol/L   Protime-INR   Result Value Ref Range    Prothrombin Time 11.4 9.0 - 12.5 sec    INR 1.1 0.8 - 1.2   Ethanol   Result Value Ref Range    Alcohol, Serum <10 <10 mg/dL   Urinalysis, Reflex to Urine Culture Urine, Clean Catch    Specimen: Urine   Result Value Ref Range    Specimen UA Urine, Catheterized     Color, UA Yellow Yellow, Straw, Reny    Appearance, UA Clear Clear    pH, UA 6.0 5.0 - 8.0    Specific Gravity, UA 1.025 1.005 - 1.030    Protein, UA Negative Negative    Glucose, UA Negative Negative    Ketones, UA 1+ (A) Negative    Bilirubin (UA) Negative Negative    Occult Blood UA Negative Negative    Nitrite, UA Negative Negative    Urobilinogen, UA 2.0-3.0 (A) <2.0 EU/dL    Leukocytes, UA Negative Negative   Drug screen panel, emergency   Result Value Ref Range    Benzodiazepines Presumptive Positive (A) Negative    Methadone metabolites Negative Negative    Cocaine (Metab.) Presumptive Positive (A) Negative    Opiate Scrn, Ur Negative Negative    Barbiturate Screen, Ur Negative Negative    Amphetamine  Screen, Ur Presumptive Positive (A) Negative    THC Presumptive Positive (A) Negative    Phencyclidine Negative Negative    Creatinine, Urine 220.9 15.0 - 325.0 mg/dL    Toxicology Information SEE COMMENT    Pregnancy, urine rapid   Result Value Ref Range    Preg Test, Ur Negative    CPK   Result Value Ref Range     (H) 20 - 180 U/L   COVID-19 Rapid Screening   Result Value Ref Range    SARS-CoV-2 RNA, Amplification, Qual Negative Negative   Basic metabolic panel   Result Value Ref Range    Sodium 141 136 - 145 mmol/L    Potassium 4.3 3.5 - 5.1 mmol/L    Chloride 113 (H) 95 - 110 mmol/L    CO2 14 (L) 23 - 29 mmol/L    Glucose 73 70 - 110 mg/dL    BUN 11 6 - 20 mg/dL    Creatinine 0.6 0.5 - 1.4 mg/dL    Calcium 8.7 8.7 - 10.5 mg/dL    Anion Gap 14 8 - 16 mmol/L    eGFR if African American >60 >60 mL/min/1.73 m^2    eGFR if non African American >60 >60 mL/min/1.73 m^2   CK   Result Value Ref Range     (H) 20 - 180 U/L   Basic Metabolic Panel   Result Value Ref Range    Sodium 140 136 - 145 mmol/L    Potassium 3.6 3.5 - 5.1 mmol/L    Chloride 110 95 - 110 mmol/L    CO2 17 (L) 23 - 29 mmol/L    Glucose 63 (L) 70 - 110 mg/dL    BUN 12 6 - 20 mg/dL    Creatinine 0.6 0.5 - 1.4 mg/dL    Calcium 8.4 (L) 8.7 - 10.5 mg/dL    Anion Gap 13 8 - 16 mmol/L    eGFR if African American >60 >60 mL/min/1.73 m^2    eGFR if non African American >60 >60 mL/min/1.73 m^2       Imaging Results:  Imaging Results    None          The EKG was ordered, reviewed, and independently interpreted by the ED provider.  Interpretation time: 09:59:10  Rate: 78 BPM  Rhythm: normal sinus rhythm  Interpretation: No acute abnromalites. No STEMI.             The Emergency Provider reviewed the vital signs and test results, which are outlined above.     ED Discussion     3:00 PM: Pt's mother states that pt is suicidal.     4:07 PM: Discussed case with Felicia Colón NP ([facility]). Dr. Soria agrees with current care and  management of pt and accepts admission.   Admitting Service: Hospital Medicine  Admitting Physician: Dr. Soria   Admit to: ICU    4:08 PM: Re-evaluated pt. I have discussed test results, shared treatment plan, and the need for admission with patient and family at bedside. Pt and family express understanding at this time and agree with all information. All questions answered. Pt and family have no further questions or concerns at this time. Pt is ready for admit.         Medical Decision Making:   Clinical Tests:   Lab Tests: Ordered and Reviewed  Medical Tests: Ordered and Reviewed           ED Medication(s):  Medications   mupirocin 2 % ointment ( Nasal Given 5/15/22 0833)   bisacodyL suppository 10 mg (has no administration in time range)   acetaminophen tablet 650 mg (has no administration in time range)   ondansetron injection 4 mg (has no administration in time range)   hydrALAZINE injection 10 mg (has no administration in time range)   haloperidol lactate injection 5 mg (has no administration in time range)   lorazepam injection 2 mg (2 mg Intravenous Given 5/14/22 0449)   famotidine tablet 20 mg (20 mg Oral Given 5/15/22 0833)   enoxaparin injection 40 mg (has no administration in time range)   potassium chloride SA CR tablet 30 mEq (has no administration in time range)   lorazepam injection 1.5 mg (1.5 mg Intravenous Given 5/13/22 1029)   haloperidol lactate injection 5 mg (5 mg Intramuscular Given 5/13/22 1126)   lorazepam injection 1 mg (1 mg Intravenous Given 5/13/22 1251)   diphenhydrAMINE injection 50 mg (50 mg Intravenous Given 5/13/22 1411)   lorazepam injection 2 mg (2 mg Intravenous Given 5/13/22 1414)   lorazepam injection 1 mg (1 mg Intravenous Given 5/13/22 1442)   lactated ringers bolus 1,000 mL (0 mLs Intravenous Stopped 5/13/22 1544)   ketamine injection 10.1 mg (10.1 mg Intravenous Given 5/13/22 1512)       There are no discharge medications for this patient.              Scribe  Attestation:   Scribe #1: I performed the above scribed service and the documentation accurately describes the services I performed. I attest to the accuracy of the note.     Attending:   Physician Attestation Statement for Scribe #1: I, Anival Lowry MD, personally performed the services described in this documentation, as scribed by Izabela Booth, in my presence, and it is both accurate and complete.           Clinical Impression       ICD-10-CM ICD-9-CM   1. Accidental drug overdose, initial encounter  T50.901A 977.9     E858.9   2. Overdose  T50.901A 977.9     E980.5   3. Psychomotor agitation  R45.1 799.29   4. QT prolongation  R94.31 794.31       Disposition:   Disposition: Admitted  Condition: Fair       Anival Lowry MD  05/15/22 0936

## 2022-05-14 PROBLEM — I10 HYPERTENSION: Status: RESOLVED | Noted: 2022-05-13 | Resolved: 2022-05-14

## 2022-05-14 LAB
ANION GAP SERPL CALC-SCNC: 14 MMOL/L (ref 8–16)
BUN SERPL-MCNC: 11 MG/DL (ref 6–20)
CALCIUM SERPL-MCNC: 8.7 MG/DL (ref 8.7–10.5)
CHLORIDE SERPL-SCNC: 113 MMOL/L (ref 95–110)
CK SERPL-CCNC: 301 U/L (ref 20–180)
CO2 SERPL-SCNC: 14 MMOL/L (ref 23–29)
CREAT SERPL-MCNC: 0.6 MG/DL (ref 0.5–1.4)
EST. GFR  (AFRICAN AMERICAN): >60 ML/MIN/1.73 M^2
EST. GFR  (NON AFRICAN AMERICAN): >60 ML/MIN/1.73 M^2
GLUCOSE SERPL-MCNC: 73 MG/DL (ref 70–110)
POTASSIUM SERPL-SCNC: 4.3 MMOL/L (ref 3.5–5.1)
SODIUM SERPL-SCNC: 141 MMOL/L (ref 136–145)

## 2022-05-14 PROCEDURE — 82550 ASSAY OF CK (CPK): CPT | Performed by: INTERNAL MEDICINE

## 2022-05-14 PROCEDURE — C9399 UNCLASSIFIED DRUGS OR BIOLOG: HCPCS | Performed by: EMERGENCY MEDICINE

## 2022-05-14 PROCEDURE — 63600175 PHARM REV CODE 636 W HCPCS: Performed by: INTERNAL MEDICINE

## 2022-05-14 PROCEDURE — 25000003 PHARM REV CODE 250: Performed by: NURSE PRACTITIONER

## 2022-05-14 PROCEDURE — 93010 ELECTROCARDIOGRAM REPORT: CPT | Mod: ,,, | Performed by: INTERNAL MEDICINE

## 2022-05-14 PROCEDURE — 93010 EKG 12-LEAD: ICD-10-PCS | Mod: ,,, | Performed by: INTERNAL MEDICINE

## 2022-05-14 PROCEDURE — 93005 ELECTROCARDIOGRAM TRACING: CPT

## 2022-05-14 PROCEDURE — 99291 PR CRITICAL CARE, E/M 30-74 MINUTES: ICD-10-PCS | Mod: ,,, | Performed by: NURSE PRACTITIONER

## 2022-05-14 PROCEDURE — 80048 BASIC METABOLIC PNL TOTAL CA: CPT | Performed by: INTERNAL MEDICINE

## 2022-05-14 PROCEDURE — 25000003 PHARM REV CODE 250: Performed by: EMERGENCY MEDICINE

## 2022-05-14 PROCEDURE — 99291 CRITICAL CARE FIRST HOUR: CPT | Mod: ,,, | Performed by: NURSE PRACTITIONER

## 2022-05-14 PROCEDURE — 36415 COLL VENOUS BLD VENIPUNCTURE: CPT | Performed by: INTERNAL MEDICINE

## 2022-05-14 PROCEDURE — 25000003 PHARM REV CODE 250: Performed by: INTERNAL MEDICINE

## 2022-05-14 PROCEDURE — 20000000 HC ICU ROOM

## 2022-05-14 RX ORDER — LORAZEPAM 2 MG/ML
2 INJECTION INTRAMUSCULAR
Status: DISCONTINUED | OUTPATIENT
Start: 2022-05-14 | End: 2022-05-15 | Stop reason: HOSPADM

## 2022-05-14 RX ADMIN — SODIUM CHLORIDE: 0.9 INJECTION, SOLUTION INTRAVENOUS at 05:05

## 2022-05-14 RX ADMIN — FAMOTIDINE 20 MG: 10 INJECTION INTRAVENOUS at 08:05

## 2022-05-14 RX ADMIN — SODIUM CHLORIDE: 0.9 INJECTION, SOLUTION INTRAVENOUS at 03:05

## 2022-05-14 RX ADMIN — MUPIROCIN: 20 OINTMENT TOPICAL at 08:05

## 2022-05-14 RX ADMIN — LORAZEPAM 2 MG: 2 INJECTION INTRAMUSCULAR; INTRAVENOUS at 02:05

## 2022-05-14 RX ADMIN — DEXMEDETOMIDINE HYDROCHLORIDE 1.4 MCG/KG/HR: 4 INJECTION INTRAVENOUS at 12:05

## 2022-05-14 RX ADMIN — DEXMEDETOMIDINE HYDROCHLORIDE 1.3 MCG/KG/HR: 4 INJECTION INTRAVENOUS at 04:05

## 2022-05-14 RX ADMIN — LORAZEPAM 2 MG: 2 INJECTION INTRAMUSCULAR; INTRAVENOUS at 04:05

## 2022-05-14 NOTE — EICU
Alerted by bedside that patient remains calm at higher doses of precedex but had to decrease due to bradycardia.   Ativan has been working well with her.     Frequency of prn ativan changed to q 2 hrs prn

## 2022-05-14 NOTE — PROGRESS NOTES
[facility] - Intensive Care ([facility])  Critical Care Medicine  Progress Note    Patient Name: Milagros Swan  MRN: [id number]  Admission Date: 2022  Hospital Length of Stay: 1 days  Code Status: Prior  Attending Provider: Josh Soria MD  Primary Care Provider: Felisha Curtis NP   Principal Problem: Encephalopathy, toxic    Subjective:     HPI:  Ms Swan is a 31 yo female with a PMH of Hep C, Polysubstance abuse/dependence and Tobacco abuse.  She was found unresponsive and EMS responded where patient awakened with IN Narcan and presented to [facility] ED at 1100 hr.  Mother contacted stated patient has been IVDA for 10 years and has been suicidal lately since boyfriend  last month of overdose.  In ED worsening delirium trying to get OOB restless with kicking, yelling, mumbled speech, confused and ripping off soft wrist restraints minimal improvement with Haldol and Ativan.  Patient PECd in ED and admitting to ICU on Precedex infusion.       Hospital/ICU Course:   - Patient seen in ED with Dr. Soria awaiting ICU bed.  She is supine on IVFs and Precedex infusion mostly calm but with any stimuli gets agitated moaning and kicking in bed.  No resp distress and VSS with mild HTN but stable oxygenation.    - semi erect in bed.  Stopped Precedex infusion this AM due to bradycardia in 40s and at times in upper 30s.  Slowly awakening still confused with mumbled speech but orientation to person and still periods of mild restlessness but no respiratory distress.  HR improving off Precedex infusion      Review of Systems   Unable to perform ROS: Mental acuity       Objective:     Vital Signs (Most Recent):  Temp: 97.2 °F (36.2 °C) (22 0305)  Pulse: (!) 50 (22 0600)  Resp: 19 (22 06)  BP: (!) 154/94 (22 0600)  SpO2: 100 % (22 0746)   Vital Signs (24h Range):  Temp:  [97.2 °F (36.2 °C)-98.1 °F (36.7 °C)] 97.2 °F (36.2 °C)  Pulse:  [] 50  Resp:  [18-30]  19  SpO2:  [97 %-100 %] 100 %  BP: (108-163)/(54-99) 154/94     Weight: 67 kg (147 lb 11.3 oz)  Body mass index is 27.02 kg/m².      Intake/Output Summary (Last 24 hours) at 5/14/2022 1001  Last data filed at 5/14/2022 0900  Gross per 24 hour   Intake 2623.55 ml   Output --   Net 2623.55 ml       Physical Exam  Vitals and nursing note reviewed.   Constitutional:       General: She is not in acute distress.     Appearance: She is well-developed. She is ill-appearing. She is not toxic-appearing or diaphoretic.      Interventions: She is restrained. She is not intubated.  HENT:      Head: Normocephalic and atraumatic.      Mouth/Throat:      Mouth: Mucous membranes are dry.   Eyes:      General: Lids are normal.      Comments: Pupils pinpoint   Neck:      Trachea: Trachea normal.   Cardiovascular:      Rate and Rhythm: Regular rhythm. Bradycardia present.      Pulses: Normal pulses.           Radial pulses are 2+ on the right side and 2+ on the left side.        Dorsalis pedis pulses are 2+ on the right side and 2+ on the left side.      Heart sounds: Normal heart sounds.   Pulmonary:      Effort: Pulmonary effort is normal. No tachypnea, accessory muscle usage or respiratory distress. She is not intubated.      Breath sounds: Normal breath sounds.   Chest:      Chest wall: No deformity or tenderness.   Abdominal:      General: Bowel sounds are normal. There is no distension.      Palpations: Abdomen is soft.      Tenderness: There is no abdominal tenderness.   Musculoskeletal:         General: Normal range of motion.      Cervical back: Normal range of motion.      Right lower leg: No edema.      Left lower leg: No edema.      Right foot: No deformity.      Left foot: No deformity.   Lymphadenopathy:      Cervical: No cervical adenopathy.   Skin:     General: Skin is warm and dry.      Capillary Refill: Capillary refill takes less than 2 seconds.      Findings: Bruising present. No rash.   Neurological:      General:  No focal deficit present.      Mental Status: She is easily aroused. She is lethargic and confused.      GCS: GCS eye subscore is 3. GCS verbal subscore is 3. GCS motor subscore is 5.      Comments: Mumbled words but does state her name still restless but not combative off Precedex infusion    Psychiatric:         Attention and Perception: She is inattentive.         Mood and Affect: Affect is labile.         Speech: Speech is slurred.         Behavior: Behavior is uncooperative and agitated.         Judgment: Judgment is inappropriate.       Lines/Drains/Airways       Peripheral Intravenous Line  Duration                  Peripheral IV - Single Lumen 05/13/22 1012 18 G Left Antecubital <1 day                    Significant Labs:    CBC/Anemia Profile:  Recent Labs   Lab 05/13/22  1026   WBC 4.61   HGB 11.9*   HCT 35.9*      MCV 85   RDW 12.9        Chemistries:  Recent Labs   Lab 05/13/22  1026      K 3.7      CO2 24   BUN 16   CREATININE 0.7   CALCIUM 9.4   ALBUMIN 3.9   PROT 7.8   BILITOT 0.7   ALKPHOS 71   ALT 64*   AST 56*       All pertinent labs within the past 24 hours have been reviewed.      Assessment/Plan:     Neuro  * Encephalopathy, toxic  Cont IVFs, soft wrist restraints   Attempt to hold Precedex infusion  Received Ativan IVP overnight due to bradycardia on Precedex infusion which was decreased overnight and still had restless overnight but less this AM  Sitter at bed side    Psychiatric  Suicidal ideation  PECd   consulted   Suicide precautions    Cardiac/Vascular  Essential hypertension  PRN Hydralazine    Other  Smoker  Will encourage tobacco cessation once fully awake and alert    Drug overdose, multiple drugs, undetermined intent, initial encounter  Long hx polysubstance abuse including IVDA for 10 years per mother  UDS + BZD, Amphetamines, THC and Cocaine  Cont IVFs  Mother thinks possibly suicidal  PECd with sitter at bed side  Suicide precautions  Consult   for placement options once medically cleared  Explore suicidal ideation once fully awake, alert and cooperative  Will encourage illicit drug cessation once fully awake and alert        Preventive Measures and Monitoring:   Stress Ulcer: Pepcid  Nutrition: regular diet once awake, alert and cooperative  Glucose control: stable  Bowel prophylaxis: PRN Dulcolax  DVT prophylaxis: SCDs only.  No anticoagulation given kicking and thrashing already with bruising  Hx CAD on B-Blocker: no hx CAD  Head of Bed/Reposition: Elevate HOB and turn Q1-2 hours   Early Mobility: bed rest  Code Status: Full     Counseling/Consultation:I have discussed the care of this patient in detail with the bedside nursing staff and Dr. Pace and Dr. Soria    Patient assessed.  Soft bilat wrist restraints renewed due to risk of pulling lines, tubes and/or climbing OOB.    Critical Care Time: 47 minutes  Critical secondary to Patient has a condition that poses threat to life and bodily function: Drug induced psychotic delirium  Patient has an abrupt change in neurologic status: Drug OD  Patient is currently on drug therapy requiring intensive monitoring for toxicity: Precedex infusion     Critical care was time spent personally by me on the following activities: development of treatment plan with patient or surrogate and bedside caregivers, discussions with consultants, evaluation of patient's response to treatment, examination of patient, ordering and performing treatments and interventions, ordering and review of laboratory studies, ordering and review of radiographic studies, pulse oximetry, re-evaluation of patient's condition. This critical care time did not overlap with that of any other provider or involve time for any procedures.     Mychal Mckeon NP  Critical Care Medicine  [facility] - Intensive Care [facility])

## 2022-05-14 NOTE — ASSESSMENT & PLAN NOTE
Cont IVFs, soft wrist restraints   Attempt to hold Precedex infusion  Received Ativan IVP overnight due to bradycardia on Precedex infusion which was decreased overnight and still had restless overnight but less this AM  Sitter at bed side

## 2022-05-14 NOTE — PLAN OF CARE
[facility] - Intensive Care (Hospital)  Initial Discharge Assessment       Primary Care Provider: Felisha Curtis NP    Admission Diagnosis: Overdose [T50.901A]  Psychomotor agitation [R45.1]  Accidental drug overdose, initial encounter [T50.901A]    Admission Date: 5/13/2022  Expected Discharge Date:     Discharge Barriers Identified: Substance Abuse, Other (see comments) (Possible suicidal thoughts)    Payor: MEDICAID / Plan: HE[place]UE [place] / Product Type: Managed Medicaid /     Extended Emergency Contact Information  Primary Emergency Contact: Alexy Swan   [facility]  Home Phone: [phone]  Relation: Father  Secondary Emergency Contact: Anisha Brower  Mobile Phone: [phone]  Relation: Mother   needed? No    Discharge Plan A: Home  Discharge Plan B: Rehab      [id number] [place], [place] - [address]  [address]  [facility] [address]  Phone: [phone] Fax: [phone]      Initial Assessment (most recent)     Adult Discharge Assessment - 05/14/22 1255        Discharge Assessment    Assessment Type Discharge Planning Assessment     Confirmed/corrected address, phone number and insurance Yes     Confirmed Demographics Correct on Facesheet     Source of Information family     When was your last doctors appointment? --   Per Mother she does believe pt has been seeing NP    Does patient/caregiver understand observation status Yes     Reason For Admission Found unresponsive     Lives With child(andre), dependent     Do you expect to return to your current living situation? --   Mother hoping for possible rehab for addiction    Do you have help at home or someone to help you manage your care at home? No     Prior to hospitilization cognitive status: Alert/Oriented     Current cognitive status: Alert/Oriented     Walking or Climbing Stairs Difficulty none     Dressing/Bathing Difficulty none     Home Layout  Able to live on 1st floor     Equipment Currently Used at Home none     Readmission within 30 days? No     Patient currently being followed by outpatient case management? No     Do you currently have service(s) that help you manage your care at home? No     Do you take prescription medications? Yes     Do you have prescription coverage? Yes     Coverage Medicaid     Do you have any problems affording any of your prescribed medications? No     Is the patient taking medications as prescribed? yes     Who is going to help you get home at discharge? Mother Anisha Brower [phone]     How do you get to doctors appointments? car, drives self     Are you on dialysis? No     Do you take coumadin? No     Discharge Plan A Home     Discharge Plan B Rehab     DME Needed Upon Discharge  none     Discharge Plan discussed with: Parent(s)     Name(s) and Number(s) Anisha Brower [phone]     Discharge Barriers Identified Substance Abuse;Other (see comments)   Possible suicidal thoughts       Relationship/Environment    Name(s) of Who Lives With Patient Anisha Brower (mother) [phone]               Swer contacted pt's mother to complete discharge assessment. Mother hopes that pt will accept some help with substance abuse and thoughts of suicide. SWer will relay to MD.VITOR provided a transitional care folder, information on advanced directives, information on pharmacy bedside delivery, and discharge planning begins on admission with contact information for any needs/questions. ELIAZAR, MSW

## 2022-05-14 NOTE — NURSING
Due to Pt HR decreasing to 40s, I titrated precedex down. Pt now waking up. When awake HR in 70s. Notified EICU to change frequency of ativan. Awaiting new orders.

## 2022-05-14 NOTE — PLAN OF CARE
POC reviewed with pt and pt's mother  Pt oriented to self and place; sedation weaned off early in shift; rhythm has improved from SB to SR without sedation  BNVWR in use; order valid until 5/15 at 1033  [facility] order signed and in chart  Sitter at bedside  Bed alarm in use; safety precautions maintained

## 2022-05-14 NOTE — ASSESSMENT & PLAN NOTE
Combined stimulant and narcotic overdose.  Psychotic agitation and she pulled out of restraint and off the gurney once in the ED.  Insufficient sedation with intermittent pushes of Lorazepam, Haloperidol, Ketamine, and Benadryl.  Started Precedex infusion on the ED.  Titrating up and will monitor closely in the ICU.  No significant derangements to blood counts or chemistries on the initial assessment.  Continuing IV fluid resuscitation and serial labwork.  Mildly hypertensive but otherwise hemodynamically and respiratory stable.    Precedex infusion with as needed Lorazepam and Haldol.  Weaning Precedex as able.

## 2022-05-14 NOTE — PROGRESS NOTES
[facility] hospital - Intensive Care [facility]  Progress Note    Patient Name: Milagros Swan  MRN: [id number]  Patient Class: IP- Inpatient   Admission Date: 2022  Length of Stay: 1 days  Attending Physician: Josh Soria MD  Primary Care Provider: Felisha Curtis NP        Subjective:     Principal Problem:Encephalopathy, toxic        HPI:  Ms. Swan was brought to the ED by EMS.  She was down and unresponsive at home.  She temporarily responded to Narcan but has been agitated and delirious.  Her mother was accompanying her at the bedside.  Her mother and father would be her surrogate decision makers as needed.  She has four young children and is unmarried.  According to her mother she has a long history of multiple substance abuse.  Drug of choice Methamphetamine.  About 2 months ago her significant other  of a drug overdose and she has been coping by increased drug use and suicidal thoughts.      Overview/Hospital Course:  No notes on file    Interval History: Progressive sinus bradycardia overnight probably due to Precedex.  Adjusting dose down and using Lorazepam as needed.  Otherwise hemodynamically and respiratory stable.  Still having episodes of agitation but not as bad.  Leg restraints removed.    Review of Systems   Unable to perform ROS: Mental status change   Objective:     Vital Signs (Most Recent):  Temp: 97.2 °F (36.2 °C) (22 0305)  Pulse: (!) 50 (22 0600)  Resp: 19 (22 0600)  BP: (!) 154/94 (22 0600)  SpO2: 100 % (22 0746)   Vital Signs (24h Range):  Temp:  [97.2 °F (36.2 °C)-98.1 °F (36.7 °C)] 97.2 °F (36.2 °C)  Pulse:  [48-90] 50  Resp:  [18-30] 19  SpO2:  [97 %-100 %] 100 %  BP: (108-163)/(54-99) 154/94     Weight: 67 kg (147 lb 11.3 oz)  Body mass index is 27.02 kg/m².    Intake/Output Summary (Last 24 hours) at 2022 1102  Last data filed at 2022 1100  Gross per 24 hour   Intake 2819.9 ml   Output --   Net 2819.9 ml       Physical Exam  Vitals and nursing note reviewed.   Constitutional:       General: She is not in acute distress.     Appearance: She is well-developed.      Comments: Somnolent   HENT:      Head: Normocephalic and atraumatic.   Eyes:      Conjunctiva/sclera: Conjunctivae normal.      Pupils: Pupils are equal, round, and reactive to light.   Neck:      Thyroid: No thyromegaly.      Vascular: No JVD.   Cardiovascular:      Rate and Rhythm: Normal rate and regular rhythm.      Heart sounds: No murmur heard.    No friction rub. No gallop.   Pulmonary:      Effort: Pulmonary effort is normal.      Breath sounds: Normal breath sounds. No wheezing or rales.   Abdominal:      General: Bowel sounds are normal. There is no distension.      Palpations: Abdomen is soft.      Tenderness: There is no abdominal tenderness. There is no guarding or rebound.   Musculoskeletal:         General: No deformity. Normal range of motion.      Cervical back: Neck supple.   Lymphadenopathy:      Cervical: No cervical adenopathy.   Skin:     General: Skin is warm and dry.      Findings: Bruising present. No rash.      Comments: Scattered bruises upper and lower extremities.   Neurological:      Deep Tendon Reflexes: Reflexes are normal and symmetric.      Comments: Spontaneously moves all four extremities.  Remains sedated and somnolent not following commands or answering questions.       Significant Labs: All pertinent labs within the past 24 hours have been reviewed.    Significant Imaging: I have reviewed all pertinent imaging results/findings within the past 24 hours.      Assessment/Plan:      * Encephalopathy, toxic  Combined stimulant and narcotic overdose.  Psychotic agitation and she pulled out of restraint and off the gurney once in the ED.  Insufficient sedation with intermittent pushes of Lorazepam, Haloperidol, Ketamine, and Benadryl.  Started Precedex infusion on the ED.  Titrating up and will monitor closely in the ICU.  No  significant derangements to blood counts or chemistries on the initial assessment.  Continuing IV fluid resuscitation and serial labwork.  Mildly hypertensive but otherwise hemodynamically and respiratory stable.    Precedex infusion with as needed Lorazepam and Haldol.  Weaning Precedex as able.    Drug overdose, multiple drugs, undetermined intent, initial encounter  Admission UDS positive for Opiate, Cocaine, Amphetamine, and THC.  She has been in drug use rehabilitation in the past.  Discuss further when appropriate.    Suicidal ideation  She has recently expressed intent to hurt herself per her mother.  In part related to the recent death of her significant other.  Tele-Psychiatry evaluation when appropriate.    Essential hypertension  Partly related to amphetamine/cocaine and agitation.  Adjusting sedation.  Hydralazine IV as needed.    Smoker   on cessation when appropriate.      VTE Risk Mitigation (From admission, onward)         Ordered     Place sequential compression device  Until discontinued         05/14/22 1034                Discharge Planning   GARCÍA:      Code Status: Prior   Is the patient medically ready for discharge?:     Reason for patient still in hospital (select all that apply): Patient trending condition and Treatment               Critical care time spent on the evaluation and treatment of severe organ dysfunction, review of pertinent labs and imaging studies, discussions with consulting providers and discussions with patient/family: 30 minutes.      Josh Soria MD  Department of Hospital Medicine   [facility] - Intensive Care ([facility])

## 2022-05-14 NOTE — ASSESSMENT & PLAN NOTE
Long hx polysubstance abuse including IVDA for 10 years per mother  UDS + BZD, Amphetamines, THC and Cocaine  Cont IVFs  Mother thinks possibly suicidal  PECd with sitter at bed side  Suicide precautions  Consult  for placement options once medically cleared  Explore suicidal ideation once fully awake, alert and cooperative  Will encourage illicit drug cessation once fully awake and alert   Yes

## 2022-05-14 NOTE — SUBJECTIVE & OBJECTIVE
Review of Systems   Unable to perform ROS: Mental acuity       Objective:     Vital Signs (Most Recent):  Temp: 97.2 °F (36.2 °C) (05/14/22 0305)  Pulse: (!) 50 (05/14/22 0600)  Resp: 19 (05/14/22 0600)  BP: (!) 154/94 (05/14/22 0600)  SpO2: 100 % (05/14/22 0746)   Vital Signs (24h Range):  Temp:  [97.2 °F (36.2 °C)-98.1 °F (36.7 °C)] 97.2 °F (36.2 °C)  Pulse:  [] 50  Resp:  [18-30] 19  SpO2:  [97 %-100 %] 100 %  BP: (108-163)/(54-99) 154/94     Weight: 67 kg (147 lb 11.3 oz)  Body mass index is 27.02 kg/m².      Intake/Output Summary (Last 24 hours) at 5/14/2022 1001  Last data filed at 5/14/2022 0900  Gross per 24 hour   Intake 2623.55 ml   Output --   Net 2623.55 ml       Physical Exam  Vitals and nursing note reviewed.   Constitutional:       General: She is not in acute distress.     Appearance: She is well-developed. She is ill-appearing. She is not toxic-appearing or diaphoretic.      Interventions: She is restrained. She is not intubated.  HENT:      Head: Normocephalic and atraumatic.      Mouth/Throat:      Mouth: Mucous membranes are dry.   Eyes:      General: Lids are normal.      Comments: Pupils pinpoint   Neck:      Trachea: Trachea normal.   Cardiovascular:      Rate and Rhythm: Regular rhythm. Bradycardia present.      Pulses: Normal pulses.           Radial pulses are 2+ on the right side and 2+ on the left side.        Dorsalis pedis pulses are 2+ on the right side and 2+ on the left side.      Heart sounds: Normal heart sounds.   Pulmonary:      Effort: Pulmonary effort is normal. No tachypnea, accessory muscle usage or respiratory distress. She is not intubated.      Breath sounds: Normal breath sounds.   Chest:      Chest wall: No deformity or tenderness.   Abdominal:      General: Bowel sounds are normal. There is no distension.      Palpations: Abdomen is soft.      Tenderness: There is no abdominal tenderness.   Musculoskeletal:         General: Normal range of motion.      Cervical  back: Normal range of motion.      Right lower leg: No edema.      Left lower leg: No edema.      Right foot: No deformity.      Left foot: No deformity.   Lymphadenopathy:      Cervical: No cervical adenopathy.   Skin:     General: Skin is warm and dry.      Capillary Refill: Capillary refill takes less than 2 seconds.      Findings: Bruising present. No rash.   Neurological:      General: No focal deficit present.      Mental Status: She is easily aroused. She is lethargic and confused.      GCS: GCS eye subscore is 3. GCS verbal subscore is 3. GCS motor subscore is 5.      Comments: Mumbled words but does state her name still restless but not combative off Precedex infusion    Psychiatric:         Attention and Perception: She is inattentive.         Mood and Affect: Affect is labile.         Speech: Speech is slurred.         Behavior: Behavior is uncooperative and agitated.         Judgment: Judgment is inappropriate.       Lines/Drains/Airways       Peripheral Intravenous Line  Duration                  Peripheral IV - Single Lumen 05/13/22 1012 18 G Left Antecubital <1 day                    Significant Labs:    CBC/Anemia Profile:  Recent Labs   Lab 05/13/22  1026   WBC 4.61   HGB 11.9*   HCT 35.9*      MCV 85   RDW 12.9        Chemistries:  Recent Labs   Lab 05/13/22  1026      K 3.7      CO2 24   BUN 16   CREATININE 0.7   CALCIUM 9.4   ALBUMIN 3.9   PROT 7.8   BILITOT 0.7   ALKPHOS 71   ALT 64*   AST 56*       All pertinent labs within the past 24 hours have been reviewed.

## 2022-05-14 NOTE — PROGRESS NOTES
Dr. florez at bedside- Ativan IVP given. Positive response to medication in addition to Precedex gtt.  Able to remove Bilat ankle restraints.

## 2022-05-14 NOTE — PLAN OF CARE
Pt currently calm, sleeping. When awake, pt has incomprehensible speech, disoriented x4, restless and combative. On room air. SB on heart monitor when sleeping, HR in 70s when awake. Titrated precedex due to RASS goal and due to bradycardia (see nurse note). PRN ativan q2h, pt tolerates well. BSWR in place. Scabs noted to bilat ankles and Left heel. Pt voided x1 this shift. Pt incontinent and placed in brief. Bed low and locked. Bed alarm on. Will continue to monitor.

## 2022-05-15 VITALS
OXYGEN SATURATION: 99 % | SYSTOLIC BLOOD PRESSURE: 132 MMHG | DIASTOLIC BLOOD PRESSURE: 72 MMHG | TEMPERATURE: 99 F | BODY MASS INDEX: 27.55 KG/M2 | RESPIRATION RATE: 17 BRPM | HEART RATE: 84 BPM | WEIGHT: 149.69 LBS | HEIGHT: 62 IN

## 2022-05-15 PROBLEM — G92.9 ENCEPHALOPATHY, TOXIC: Status: RESOLVED | Noted: 2022-05-13 | Resolved: 2022-05-15

## 2022-05-15 LAB
ANION GAP SERPL CALC-SCNC: 13 MMOL/L (ref 8–16)
BUN SERPL-MCNC: 12 MG/DL (ref 6–20)
CALCIUM SERPL-MCNC: 8.4 MG/DL (ref 8.7–10.5)
CHLORIDE SERPL-SCNC: 110 MMOL/L (ref 95–110)
CO2 SERPL-SCNC: 17 MMOL/L (ref 23–29)
CREAT SERPL-MCNC: 0.6 MG/DL (ref 0.5–1.4)
EST. GFR  (AFRICAN AMERICAN): >60 ML/MIN/1.73 M^2
EST. GFR  (NON AFRICAN AMERICAN): >60 ML/MIN/1.73 M^2
GLUCOSE SERPL-MCNC: 63 MG/DL (ref 70–110)
POTASSIUM SERPL-SCNC: 3.6 MMOL/L (ref 3.5–5.1)
SODIUM SERPL-SCNC: 140 MMOL/L (ref 136–145)

## 2022-05-15 PROCEDURE — 80048 BASIC METABOLIC PNL TOTAL CA: CPT | Performed by: NURSE PRACTITIONER

## 2022-05-15 PROCEDURE — 99291 CRITICAL CARE FIRST HOUR: CPT | Mod: ,,, | Performed by: NURSE PRACTITIONER

## 2022-05-15 PROCEDURE — 99291 PR CRITICAL CARE, E/M 30-74 MINUTES: ICD-10-PCS | Mod: ,,, | Performed by: NURSE PRACTITIONER

## 2022-05-15 PROCEDURE — 36415 COLL VENOUS BLD VENIPUNCTURE: CPT | Performed by: NURSE PRACTITIONER

## 2022-05-15 PROCEDURE — 99233 SBSQ HOSP IP/OBS HIGH 50: CPT | Mod: 95,AF,HB, | Performed by: STUDENT IN AN ORGANIZED HEALTH CARE EDUCATION/TRAINING PROGRAM

## 2022-05-15 PROCEDURE — 25000003 PHARM REV CODE 250: Performed by: NURSE PRACTITIONER

## 2022-05-15 PROCEDURE — 63600175 PHARM REV CODE 636 W HCPCS: Performed by: NURSE PRACTITIONER

## 2022-05-15 PROCEDURE — 96372 THER/PROPH/DIAG INJ SC/IM: CPT

## 2022-05-15 PROCEDURE — 99233 PR SUBSEQUENT HOSPITAL CARE,LEVL III: ICD-10-PCS | Mod: 95,AF,HB, | Performed by: STUDENT IN AN ORGANIZED HEALTH CARE EDUCATION/TRAINING PROGRAM

## 2022-05-15 RX ORDER — ENOXAPARIN SODIUM 100 MG/ML
40 INJECTION SUBCUTANEOUS EVERY 24 HOURS
Status: DISCONTINUED | OUTPATIENT
Start: 2022-05-15 | End: 2022-05-15 | Stop reason: HOSPADM

## 2022-05-15 RX ORDER — FAMOTIDINE 20 MG/1
20 TABLET, FILM COATED ORAL 2 TIMES DAILY
Status: DISCONTINUED | OUTPATIENT
Start: 2022-05-15 | End: 2022-05-15

## 2022-05-15 RX ORDER — POTASSIUM CHLORIDE 750 MG/1
30 TABLET, EXTENDED RELEASE ORAL ONCE
Status: COMPLETED | OUTPATIENT
Start: 2022-05-15 | End: 2022-05-15

## 2022-05-15 RX ADMIN — MUPIROCIN: 20 OINTMENT TOPICAL at 08:05

## 2022-05-15 RX ADMIN — ENOXAPARIN SODIUM 40 MG: 100 INJECTION SUBCUTANEOUS at 05:05

## 2022-05-15 RX ADMIN — SODIUM CHLORIDE: 0.9 INJECTION, SOLUTION INTRAVENOUS at 01:05

## 2022-05-15 RX ADMIN — FAMOTIDINE 20 MG: 20 TABLET ORAL at 08:05

## 2022-05-15 RX ADMIN — POTASSIUM CHLORIDE 30 MEQ: 750 TABLET, EXTENDED RELEASE ORAL at 10:05

## 2022-05-15 NOTE — ASSESSMENT & PLAN NOTE
Admission UDS positive for Cocaine, Amphetamine, and THC.  She has been in drug use rehabilitation in the past.  Discuss further when appropriate.

## 2022-05-15 NOTE — NURSING
Moraima from the call center called and pt has been accepted into [facility] in [place], [place] under the care of Dr Caraballo number for report [phone] option 1

## 2022-05-15 NOTE — NURSING
Pt to room per wheelchair with ICU nurse Nela Quesada and one on one sitter present at bedside, Pt in 542 PCE room. Pt awake and alert to person up to restroom able to ambulate per self.

## 2022-05-15 NOTE — HOSPITAL COURSE
Admitted for evaluation and treatment of acute encephalopathy.  Initial evaluation showed multi-stimulant intoxication.  Cocaine, Amphetamine, and THC.  The patient has a history of drug abuse.  PEC initiated in the Emergency Department followed by [facility] for being gravely disabled.  Her mother also reported recent suicidal ideation.  She remained severely agitated and psychotic after multiple doses or Lorazepam and Haloperidol.  We achieved adequate sedation after starting a Precedex infusion at increasing doses.  She was monitored in the ICU with a sitter.  We successfully weaned her from Precedex and she regained a normal level of consciousness.  There were no significant metabolic derangements at admission or on interval labwork.  She remained hemodynamically stable and fever free.  A Tele-Psychiatry consultation was completed after she transferred out of the ICU.  Discharge plan to transfer to inpatient behavioral health for further stabilization.

## 2022-05-15 NOTE — ASSESSMENT & PLAN NOTE
Long hx polysubstance abuse including IVDA for 10 years per mother  UDS + BZD, Amphetamines, THC and Cocaine  Mother thinks possibly suicidal but patient denies this AM  CECd with sitter at bed side  Suicide precautions  Consulted  for placement options once medically cleared  Patient is denying suicidal ideations this AM  Encouraged illicit drug cessation

## 2022-05-15 NOTE — PLAN OF CARE
Pt more alert this shift. Oriented to self, birthday, and time. Pt states she does not know why she is in the hospital. Reoriented pt. Removed restraints at 0215, tolerating well and remains calm. Pt able to verbalize majority of her needs. VSS. On room air. NSR on heart monitor. Sitter at bedside. Bed low and locked. Bed alarm on. Will continue to monitor.

## 2022-05-15 NOTE — NURSING TRANSFER
Nursing Transfer Note      5/15/2022     Reason patient is being transferred: medically appropriate    Transfer ICU 7 to 542    Transfer via wheelchair    Transfer with sitter    Transported by PHILIP[facility] Shepherd    Any special needs or follow-up needed: [facility]    Chart send with patient: yes    Notified: pt's family notified

## 2022-05-15 NOTE — PROGRESS NOTES
[facility] - Intensive Care ([facility])  Critical Care Medicine  Progress Note    Patient Name: Milagros Swan  MRN: [id number]  Admission Date: 2022  Hospital Length of Stay: 2 days  Code Status: Prior  Attending Provider: Josh Soria MD  Primary Care Provider: Felisha Curtis NP   Principal Problem: Encephalopathy, toxic    Subjective:     HPI:  Ms Swan is a 31 yo female with a PMH of Hep C, Polysubstance abuse/dependence and Tobacco abuse.  She was found unresponsive and EMS responded where patient awakened with IN Narcan and presented to [facility] ED at 1100 hr.  Mother contacted stated patient has been IVDA for 10 years and has been suicidal lately since boyfriend  last month of overdose.  In ED worsening delirium trying to get OOB restless with kicking, yelling, mumbled speech, confused and ripping off soft wrist restraints minimal improvement with Haldol and Ativan.  Patient PECd in ED and admitting to ICU on Precedex infusion.       Hospital/ICU Course:   - Patient seen in ED with Dr. Soria awaiting ICU bed.  She is supine on IVFs and Precedex infusion mostly calm but with any stimuli gets agitated moaning and kicking in bed.  No resp distress and VSS with mild HTN but stable oxygenation.    - semi erect in bed.  Stopped Precedex infusion this AM due to bradycardia in 40s and at times in upper 30s.  Slowly awakening still confused with mumbled speech but orientation to person and still periods of mild restlessness but no respiratory distress.  HR improving off Precedex infusion  5/15 - Upright in bed calm and cooperative off Precedex infusion in no distress with VSS and RA %      Review of Systems   Constitutional:  Positive for malaise/fatigue. Negative for chills and fever.   HENT:  Negative for congestion.    Eyes:  Negative for blurred vision.   Respiratory:  Negative for cough, sputum production and shortness of breath.    Cardiovascular:  Negative for chest  pain and leg swelling.   Gastrointestinal:  Negative for abdominal pain, nausea and vomiting.   Genitourinary:  Negative for dysuria.   Musculoskeletal:  Negative for myalgias.   Skin:  Negative for rash.   Neurological:  Negative for dizziness, weakness and headaches.   Endo/Heme/Allergies:  Does not bruise/bleed easily.   Psychiatric/Behavioral:  Positive for substance abuse. Negative for suicidal ideas. The patient is not nervous/anxious.        Objective:     Vital Signs (Most Recent):  Temp: 98.3 °F (36.8 °C) (05/15/22 0700)  Pulse: 71 (05/15/22 0800)  Resp: (!) 23 (05/15/22 0800)  BP: 128/76 (05/15/22 0800)  SpO2: 100 % (05/15/22 0800)   Vital Signs (24h Range):  Temp:  [96.8 °F (36 °C)-98.4 °F (36.9 °C)] 98.3 °F (36.8 °C)  Pulse:  [47-88] 71  Resp:  [17-38] 23  SpO2:  [92 %-100 %] 100 %  BP: (117-165)/(67-97) 128/76     Weight: 67.9 kg (149 lb 11.1 oz)  Body mass index is 27.38 kg/m².      Intake/Output Summary (Last 24 hours) at 5/15/2022 0837  Last data filed at 5/15/2022 0700  Gross per 24 hour   Intake 2291.74 ml   Output --   Net 2291.74 ml       Physical Exam  Vitals and nursing note reviewed.   Constitutional:       General: She is awake. She is not in acute distress.     Appearance: She is well-developed. She is ill-appearing. She is not toxic-appearing or diaphoretic.      Interventions: She is not intubated.  HENT:      Head: Normocephalic and atraumatic.      Mouth/Throat:      Mouth: Mucous membranes are moist.   Eyes:      General: Lids are normal.      Comments: Pupils pinpoint   Neck:      Trachea: Trachea normal.   Cardiovascular:      Rate and Rhythm: Normal rate and regular rhythm.      Pulses: Normal pulses.           Radial pulses are 2+ on the right side and 2+ on the left side.        Dorsalis pedis pulses are 2+ on the right side and 2+ on the left side.      Heart sounds: Normal heart sounds.   Pulmonary:      Effort: Pulmonary effort is normal. No tachypnea, accessory muscle usage or  respiratory distress. She is not intubated.      Breath sounds: Normal breath sounds.   Chest:      Chest wall: No deformity or tenderness.   Abdominal:      General: Bowel sounds are normal. There is no distension.      Palpations: Abdomen is soft.      Tenderness: There is no abdominal tenderness.   Musculoskeletal:         General: Normal range of motion.      Cervical back: Normal range of motion.      Right lower leg: No edema.      Left lower leg: No edema.      Right foot: No deformity.      Left foot: No deformity.   Lymphadenopathy:      Cervical: No cervical adenopathy.   Skin:     General: Skin is warm and dry.      Capillary Refill: Capillary refill takes less than 2 seconds.      Findings: Bruising present. No rash.   Neurological:      General: No focal deficit present.      Mental Status: She is alert and oriented to person, place, and time. Mental status is at baseline.      GCS: GCS eye subscore is 4. GCS verbal subscore is 5. GCS motor subscore is 6.      Comments: Fully awake, alert and responsive today   Psychiatric:         Attention and Perception: Attention normal.         Mood and Affect: Affect is flat.         Speech: Speech normal.         Behavior: Behavior is cooperative.         Thought Content: Thought content normal.         Cognition and Memory: Cognition normal.         Judgment: Judgment normal.       Vents:       Lines/Drains/Airways       Peripheral Intravenous Line  Duration                  Peripheral IV - Single Lumen 05/13/22 1012 18 G Left Antecubital 1 day                    Significant Labs:    CBC/Anemia Profile:  Recent Labs   Lab 05/13/22  1026   WBC 4.61   HGB 11.9*   HCT 35.9*      MCV 85   RDW 12.9        Chemistries:  Recent Labs   Lab 05/13/22  1026 05/14/22  1201 05/15/22  0712    141 140   K 3.7 4.3 3.6    113* 110   CO2 24 14* 17*   BUN 16 11 12   CREATININE 0.7 0.6 0.6   CALCIUM 9.4 8.7 8.4*   ALBUMIN 3.9  --   --    PROT 7.8  --   --     BILITOT 0.7  --   --    ALKPHOS 71  --   --    ALT 64*  --   --    AST 56*  --   --            Assessment/Plan:     Neuro  * Encephalopathy, toxic  Resolved  I/O balance + 4.7 L and tolerated breakfast eating all, will H/L IVFs  D/C soft wrist restraints   Off Precedex infusion  Sitter at bed side    Psychiatric  Suicidal ideation  CECd 5/14   consulted   Suicide precautions  Patient denies this AM    Cardiac/Vascular  Essential hypertension  -145 last 24 hours  PRN Hydralazine    Other  Smoker  Encouraged tobacco cessation     Drug overdose, multiple drugs, undetermined intent, initial encounter  Long hx polysubstance abuse including IVDA for 10 years per mother  UDS + BZD, Amphetamines, THC and Cocaine  Mother thinks possibly suicidal but patient denies this AM  CECd with sitter at bed side  Suicide precautions  Consulted  for placement options once medically cleared  Patient is denying suicidal ideations this AM  Encouraged illicit drug cessation       Preventive Measures and Monitoring:   Stress Ulcer: Pepcid  Nutrition: regular diet   Glucose control: stable  Bowel prophylaxis: PRN Dulcolax  DVT prophylaxis: LMWH and SCDs  Hx CAD on B-Blocker: no hx CAD  Head of Bed/Reposition: Elevate HOB and turn Q1-2 hours   Early Mobility: OOB this AM  Code Status: Full     Counseling/Consultation:I have discussed the care of this patient in detail with the bedside nursing staff and Dr. Pace and Dr. Soria    Will transfer to Med Surg with sitter and suicide precautions and will sign off.      Critical Care Time: 46 minutes  Critical secondary to Patient has a condition that poses threat to life and bodily function: Drug induced psychotic delirium  Patient has an abrupt change in neurologic status: Drug OD     Critical care was time spent personally by me on the following activities: development of treatment plan with patient or surrogate and bedside caregivers, discussions with  consultants, evaluation of patient's response to treatment, examination of patient, ordering and performing treatments and interventions, ordering and review of laboratory studies, ordering and review of radiographic studies, pulse oximetry, re-evaluation of patient's condition. This critical care time did not overlap with that of any other provider or involve time for any procedures.     Mychal Mckeon, NP  Critical Care Medicine  [facility] - Intensive Care ([facility])

## 2022-05-15 NOTE — SUBJECTIVE & OBJECTIVE
Interval History: Sinus bradycardia with a rate in the 50s now.  Otherwise respiratory and hemodynamically stable.  Alert but sleepy.  Cooperative not requiring sedation.  Minimally answering questions.  Transfer out of ICU.    Review of Systems   Constitutional:  Negative for chills and fever.   HENT:  Negative for congestion and sore throat.    Eyes:  Negative for visual disturbance.   Respiratory:  Negative for cough, shortness of breath and wheezing.    Cardiovascular:  Negative for chest pain, palpitations and leg swelling.   Gastrointestinal:  Negative for abdominal pain, blood in stool, constipation, diarrhea, nausea and vomiting.   Genitourinary:  Negative for dysuria and hematuria.   Musculoskeletal:  Negative for arthralgias and back pain.   Skin:  Negative for rash and wound.   Neurological:  Negative for dizziness, weakness, light-headedness and numbness.   Hematological:  Negative for adenopathy.   Objective:     Vital Signs (Most Recent):  Temp: 98.3 °F (36.8 °C) (05/15/22 0700)  Pulse: 91 (05/15/22 0900)  Resp: (!) 22 (05/15/22 0900)  BP: 136/82 (05/15/22 0900)  SpO2: 98 % (05/15/22 0900)   Vital Signs (24h Range):  Temp:  [96.8 °F (36 °C)-98.4 °F (36.9 °C)] 98.3 °F (36.8 °C)  Pulse:  [52-91] 91  Resp:  [17-38] 22  SpO2:  [92 %-100 %] 98 %  BP: (117-165)/(67-97) 136/82     Weight: 67.9 kg (149 lb 11.1 oz)  Body mass index is 27.38 kg/m².    Intake/Output Summary (Last 24 hours) at 5/15/2022 0955  Last data filed at 5/15/2022 0900  Gross per 24 hour   Intake 2388.71 ml   Output --   Net 2388.71 ml        Physical Exam  Vitals and nursing note reviewed.   Constitutional:       General: She is not in acute distress.     Appearance: She is well-developed.      Comments: Somnolent   HENT:      Head: Normocephalic and atraumatic.   Eyes:      Conjunctiva/sclera: Conjunctivae normal.      Pupils: Pupils are equal, round, and reactive to light.   Neck:      Thyroid: No thyromegaly.      Vascular: No JVD.    Cardiovascular:      Rate and Rhythm: Normal rate and regular rhythm.      Heart sounds: No murmur heard.    No friction rub. No gallop.   Pulmonary:      Effort: Pulmonary effort is normal.      Breath sounds: Normal breath sounds. No wheezing or rales.   Abdominal:      General: Bowel sounds are normal. There is no distension.      Palpations: Abdomen is soft.      Tenderness: There is no abdominal tenderness. There is no guarding or rebound.   Musculoskeletal:         General: No deformity. Normal range of motion.      Cervical back: Neck supple.   Lymphadenopathy:      Cervical: No cervical adenopathy.   Skin:     General: Skin is warm and dry.      Findings: Bruising present. No rash.      Comments: Scattered bruises upper and lower extremities.   Neurological:      Deep Tendon Reflexes: Reflexes are normal and symmetric.      Comments: Spontaneously moves all four extremities.  Remains sedated and somnolent not following commands or answering questions.       Significant Labs: All pertinent labs within the past 24 hours have been reviewed.    Significant Imaging: I have reviewed all pertinent imaging results/findings within the past 24 hours.

## 2022-05-15 NOTE — PLAN OF CARE
Pt transferred from ICU today. She is alert to self and is here with CEC and one on one sitter at bedside.

## 2022-05-15 NOTE — ASSESSMENT & PLAN NOTE
Resolved  I/O balance + 4.7 L and tolerated breakfast eating all, will H/L IVFs  D/C soft wrist restraints   Off Precedex infusion  Sitter at bed side

## 2022-05-15 NOTE — SUBJECTIVE & OBJECTIVE
Review of Systems   Constitutional:  Positive for malaise/fatigue. Negative for chills and fever.   HENT:  Negative for congestion.    Eyes:  Negative for blurred vision.   Respiratory:  Negative for cough, sputum production and shortness of breath.    Cardiovascular:  Negative for chest pain and leg swelling.   Gastrointestinal:  Negative for abdominal pain, nausea and vomiting.   Genitourinary:  Negative for dysuria.   Musculoskeletal:  Negative for myalgias.   Skin:  Negative for rash.   Neurological:  Negative for dizziness, weakness and headaches.   Endo/Heme/Allergies:  Does not bruise/bleed easily.   Psychiatric/Behavioral:  Positive for substance abuse. Negative for suicidal ideas. The patient is not nervous/anxious.        Objective:     Vital Signs (Most Recent):  Temp: 98.3 °F (36.8 °C) (05/15/22 0700)  Pulse: 71 (05/15/22 0800)  Resp: (!) 23 (05/15/22 0800)  BP: 128/76 (05/15/22 0800)  SpO2: 100 % (05/15/22 0800)   Vital Signs (24h Range):  Temp:  [96.8 °F (36 °C)-98.4 °F (36.9 °C)] 98.3 °F (36.8 °C)  Pulse:  [47-88] 71  Resp:  [17-38] 23  SpO2:  [92 %-100 %] 100 %  BP: (117-165)/(67-97) 128/76     Weight: 67.9 kg (149 lb 11.1 oz)  Body mass index is 27.38 kg/m².      Intake/Output Summary (Last 24 hours) at 5/15/2022 0837  Last data filed at 5/15/2022 0700  Gross per 24 hour   Intake 2291.74 ml   Output --   Net 2291.74 ml       Physical Exam  Vitals and nursing note reviewed.   Constitutional:       General: She is awake. She is not in acute distress.     Appearance: She is well-developed. She is ill-appearing. She is not toxic-appearing or diaphoretic.      Interventions: She is not intubated.  HENT:      Head: Normocephalic and atraumatic.      Mouth/Throat:      Mouth: Mucous membranes are moist.   Eyes:      General: Lids are normal.      Comments: Pupils pinpoint   Neck:      Trachea: Trachea normal.   Cardiovascular:      Rate and Rhythm: Normal rate and regular rhythm.      Pulses: Normal  pulses.           Radial pulses are 2+ on the right side and 2+ on the left side.        Dorsalis pedis pulses are 2+ on the right side and 2+ on the left side.      Heart sounds: Normal heart sounds.   Pulmonary:      Effort: Pulmonary effort is normal. No tachypnea, accessory muscle usage or respiratory distress. She is not intubated.      Breath sounds: Normal breath sounds.   Chest:      Chest wall: No deformity or tenderness.   Abdominal:      General: Bowel sounds are normal. There is no distension.      Palpations: Abdomen is soft.      Tenderness: There is no abdominal tenderness.   Musculoskeletal:         General: Normal range of motion.      Cervical back: Normal range of motion.      Right lower leg: No edema.      Left lower leg: No edema.      Right foot: No deformity.      Left foot: No deformity.   Lymphadenopathy:      Cervical: No cervical adenopathy.   Skin:     General: Skin is warm and dry.      Capillary Refill: Capillary refill takes less than 2 seconds.      Findings: Bruising present. No rash.   Neurological:      General: No focal deficit present.      Mental Status: She is alert and oriented to person, place, and time. Mental status is at baseline.      GCS: GCS eye subscore is 4. GCS verbal subscore is 5. GCS motor subscore is 6.      Comments: Fully awake, alert and responsive today   Psychiatric:         Attention and Perception: Attention normal.         Mood and Affect: Affect is flat.         Speech: Speech normal.         Behavior: Behavior is cooperative.         Thought Content: Thought content normal.         Cognition and Memory: Cognition normal.         Judgment: Judgment normal.       Vents:       Lines/Drains/Airways       Peripheral Intravenous Line  Duration                  Peripheral IV - Single Lumen 05/13/22 1012 18 G Left Antecubital 1 day                    Significant Labs:    CBC/Anemia Profile:  Recent Labs   Lab 05/13/22  1026   WBC 4.61   HGB 11.9*   HCT 35.9*       MCV 85   RDW 12.9        Chemistries:  Recent Labs   Lab 05/13/22  1026 05/14/22  1201 05/15/22  0712    141 140   K 3.7 4.3 3.6    113* 110   CO2 24 14* 17*   BUN 16 11 12   CREATININE 0.7 0.6 0.6   CALCIUM 9.4 8.7 8.4*   ALBUMIN 3.9  --   --    PROT 7.8  --   --    BILITOT 0.7  --   --    ALKPHOS 71  --   --    ALT 64*  --   --    AST 56*  --   --

## 2022-05-15 NOTE — CONSULTS
"[facility]  Psychiatry  Telepsychiatry Consult Note    Patient agreeable to consultation via telepsychiatry.    Tele-Consultation from Psychiatry started: 5/15/2022 at 3:39p  The chief complaint leading to psychiatric consultation is: substance abuse and SI  This consultation was requested by Dr. Soria, attending hospitalist  The location of the consulting psychiatrist is [place], [place]  The patient location is  [facility] MEDICAL SURGICAL UNIT   The patient admitted 2022  Also present with the patient at the time of the consultation: nurse    Patient Identification:   Milagros Swan is a 32 y.o. female.    Patient information was obtained from patient, collateral and chart review    Consults  Consult Start Time: 05/15/2022 15:37 CDT  Consult End Time: 05/15/2022 16:30 CDT        Subjective:     History of Present Illness:  Per hospitalist: "Ms. Swan was brought to the ED by EMS.  She was down and unresponsive at home.  She temporarily responded to Narcan but has been agitated and delirious.  Her mother was accompanying her at the bedside.  Her mother and father would be her surrogate decision makers as needed.  She has four young children and is unmarried.  According to her mother she has a long history of multiple substance abuse.  Drug of choice Methamphetamine.  About 2 months ago her significant other  of a drug overdose and she has been coping by increased drug use and suicidal thoughts."     Per chart review, pt was given haldol and multiple doses of lorazepam on presentation to ED. No PRN medications given in last 24 hours.    Interval history, pt transferred out of ICU today. Pt has her mother and her sister at her bedside at start of interview, she is sleepy and sullen.    Bedside sitter reports she has been interactive and conversant today, has eaten all her meals.     On interview:   She repeatedly nods off during interview and states when roused, "I'm just tired." She does " "not remember what was going on prior to coming to the hospitalizaton. Is aware she is in [place], that she overdosed. Denies that this overdose was a suicide attempt. Non spontaneous speech and guarded. Reports IVDU. Says mostly "I don't know." Says "yes" regarding desire to engage in another rehab. Not able or willing to quantify recent substance use for this interviewer. Denies hx of psych treatment.     Collateral-  Mom- Geneva Kelly [phone], Sister Emilia Bonner [phone]   Both are significantly worried about her, says she has been not like herself and doing poorly since relapse (uncertain how soon after most recent rehab course last summer in [place], suspected to be over the winter, Dec 2021). They are aware of active probation/warrants for her arrest, for certain in [place].      Psych Review of Symptoms:  Unreliable historian, pt non engaging with ROS in sincere fashion    Biological considerations: no known hx hypothyroidism, b12 deficiency, syphillis, autoimmune disorders, strokes. Chronic medical diseases not in care of PCP. Pt has Hep C, completed partial course of treatment.    Psychiatric History:   Previous Psychiatric Hospitalizations: two previous rehabs, no inpatient psych  Previous Medication Trials: none  Previous Suicide Attempts: unclear  History of Violence: yes  History of Depression: none  History of Keiry: in setting of drug use  History of Auditory/Visual Hallucination: in setting of drug use   History of Delusions: none   Outpatient psychiatrist (current & past): none    Substance Abuse History:  Tobacco: none  Alcohol: none  Illicit Substances: age 24 first use, marijuana to opiates/cocaine/meth  Detox/Rehab: twice, May 2021- 30 day program [facility]     Legal History: Past charges/incarcerations: yes, on probation - has active warrants in [place]    Family Psychiatric History:   none    Social History:  Developmental/Childhood: pt did not discuss, Isabel " "Mahi  *Education: dropped out a week before graduation, got GED  Employment Status/Finances: not employed  Relationship Status/Sexual Orientation: partner recently overdosed/  Children: daughter age 12, lives with dad, four children total  Housing Status: lives with brother currently   history: none  Access to gun: unknown  Buddhist: not discussed  Recreation/Hobbies: drug use    Psychiatric Mental Status Exam:  Arousal: somnolent (suspect feigned), appears disheveled  Sensorium/Orientation: oriented to grossly intact, person, place, situation, year  Behavior/Cooperation: reluctant to participate, eye contact minimal   Speech: soft, non-spontaneous, monotone  Language: grossly intact  Mood: "I don't know"   Affect: flat  Thought Process: poverty of thought  Thought Content: CHANDRA  Auditory hallucinations: no RIS observed  Visual hallucinations: "  Paranoia: denies  Delusions:  denies  Suicidal ideation: denies  Homicidal ideation: denies  Attention/Concentration:  intact  Memory:    Recent:  Absent   Remote: Intact  Fund of Knowledge:  CHANDRA  Abstract reasoning: CHANDRA  Insight: poor awareness of illness  Judgment: impaired due to poor engagement with interviewer     Past Medical History:   Past Medical History:   Diagnosis Date    Hepatitis C     Hypertension 2022    Substance abuse       Laboratory Data:   Labs Reviewed   CBC W/ AUTO DIFFERENTIAL - Abnormal; Notable for the following components:       Result Value    Hemoglobin 11.9 (*)     Hematocrit 35.9 (*)     All other components within normal limits   COMPREHENSIVE METABOLIC PANEL - Abnormal; Notable for the following components:    AST 56 (*)     ALT 64 (*)     All other components within normal limits   URINALYSIS, REFLEX TO URINE CULTURE - Abnormal; Notable for the following components:    Ketones, UA 1+ (*)     Urobilinogen, UA 2.0-3.0 (*)     All other components within normal limits    Narrative:     Specimen Source->Urine   DRUG SCREEN " PANEL, URINE EMERGENCY - Abnormal; Notable for the following components:    Benzodiazepines Presumptive Positive (*)     Cocaine (Metab.) Presumptive Positive (*)     Amphetamine Screen, Ur Presumptive Positive (*)     THC Presumptive Positive (*)     All other components within normal limits    Narrative:     Specimen Source->Urine   CK - Abnormal; Notable for the following components:     (*)     All other components within normal limits   AMMONIA   PROTIME-INR   ALCOHOL,MEDICAL (ETHANOL)   PREGNANCY TEST, URINE RAPID    Narrative:     Specimen Source->Urine   SARS-COV-2 RNA AMPLIFICATION, QUAL       Neurological History:  Seizures: unknown  Head trauma: unknown    Allergies:   Review of patient's allergies indicates:  No Known Allergies    Medications in ER:   Medications   mupirocin 2 % ointment ( Nasal Given 5/15/22 0833)   bisacodyL suppository 10 mg (has no administration in time range)   acetaminophen tablet 650 mg (has no administration in time range)   ondansetron injection 4 mg (has no administration in time range)   hydrALAZINE injection 10 mg (has no administration in time range)   haloperidol lactate injection 5 mg (has no administration in time range)   lorazepam injection 2 mg (2 mg Intravenous Given 5/14/22 0449)   enoxaparin injection 40 mg (has no administration in time range)   lorazepam injection 1.5 mg (1.5 mg Intravenous Given 5/13/22 1029)   haloperidol lactate injection 5 mg (5 mg Intramuscular Given 5/13/22 1126)   lorazepam injection 1 mg (1 mg Intravenous Given 5/13/22 1251)   diphenhydrAMINE injection 50 mg (50 mg Intravenous Given 5/13/22 1411)   lorazepam injection 2 mg (2 mg Intravenous Given 5/13/22 1414)   lorazepam injection 1 mg (1 mg Intravenous Given 5/13/22 1442)   lactated ringers bolus 1,000 mL (0 mLs Intravenous Stopped 5/13/22 1544)   ketamine injection 10.1 mg (10.1 mg Intravenous Given 5/13/22 1512)   potassium chloride SA CR tablet 30 mEq (30 mEq Oral Given  5/15/22 1036)       Medications at home: none    No new subjective & objective note has been filed under this hospital service since the last note was generated.      Assessment - Diagnosis - Goals:     Assessment:  Methamphetamine Use Disorder, Severe, in withdrawal  Polysubstance Use  IVDU  Unable to rule out Intentional Overdose, MDD  Bereavement    Concern for imminent suicide risk as pt with poor insight and judgment, appear to be irritable and flat, no reality-based future-oriented ideation, engagement with decision-making. Family believes also she is at imminent risk of self-harm. No current treatment providers to follow up care.    Plan:  1. Dispo/Legal Status: Cont PEC at this time as the pt is currently gravely disabled due to an acute psychiatric illness. Seek inpt bed for pt safety and stabilization when/if medically cleared by the inpatient team. Be advised, pt has an active warrant, which does not preclude her admission to a unit or admission to rehab, however treatment center case management should be made aware of legal status.  2. Scheduled Medications: Cont PRN medications as currently ordered. Defer changes to primary inpt team. Defer any non-psych meds to the hospitalist service until transfer.  3. PRN Medications: Ativan prn non-redirectable agitation or withdrawal-related symptoms.  4. Precautions/Nursing: withdrawal, suicide and elopement precautions  5. To-Do: Continue to observe pt's behavior daily and re-consult telepsych with any acute changes in orientation, mental status.      Time with patient: 21 minutes      More than 50% of the time was spent counseling/coordinating care    Consulting clinician was informed of the encounter and consult note.    Consultation ended: 5/15/2022 at 4:26pm    April Tate MD   Psychiatry  [facility]

## 2022-05-16 NOTE — PLAN OF CARE
Problem: Fall Injury Risk  Goal: Absence of Fall and Fall-Related Injury  Outcome: Met     Problem: Adult Inpatient Plan of Care  Goal: Plan of Care Review  Outcome: Met  Goal: Patient-Specific Goal (Individualized)  Outcome: Met  Goal: Absence of Hospital-Acquired Illness or Injury  Outcome: Met  Goal: Optimal Comfort and Wellbeing  Outcome: Met  Goal: Readiness for Transition of Care  Outcome: Met     Problem: Skin Injury Risk Increased  Goal: Skin Health and Integrity  Outcome: Met

## 2022-05-16 NOTE — NURSING
Patient discharged via EMS in stable condition. IV removed and report called to [facility], Mercedes Hernandez RN.

## 2022-05-17 NOTE — DISCHARGE SUMMARY
[facility] Medicine  Discharge Summary      Patient Name: Milagros Swan  MRN: [id number]  Patient Class: IP- Inpatient  Admission Date: 2022  Hospital Length of Stay: 2 days  Discharge Date and Time: 5/15/2022  9:14 PM  Attending Physician: No att. providers found   Discharging Provider: Josh Soria MD  Primary Care Provider: Felisha Curtis NP      HPI:   Ms. Swan was brought to the ED by EMS.  She was down and unresponsive at home.  She temporarily responded to Narcan but has been agitated and delirious.  Her mother was accompanying her at the bedside.  Her mother and father would be her surrogate decision makers as needed.  She has four young children and is unmarried.  According to her mother she has a long history of multiple substance abuse.  Drug of choice Methamphetamine.  About 2 months ago her significant other  of a drug overdose and she has been coping by increased drug use and suicidal thoughts.      * No surgery found *      Hospital Course:   Admitted for evaluation and treatment of acute encephalopathy.  Initial evaluation showed multi-stimulant intoxication.  Cocaine, Amphetamine, and THC.  The patient has a history of drug abuse.  PEC initiated in the Emergency Department followed by [facility] for being gravely disabled.  Her mother also reported recent suicidal ideation.  She remained severely agitated and psychotic after multiple doses or Lorazepam and Haloperidol.  We achieved adequate sedation after starting a Precedex infusion at increasing doses.  She was monitored in the ICU with a sitter.  We successfully weaned her from Precedex and she regained a normal level of consciousness.  There were no significant metabolic derangements at admission or on interval labwork.  She remained hemodynamically stable and fever free.  A Tele-Psychiatry consultation was completed after she transferred out of the ICU.  Discharge plan to transfer to inpatient behavioral  [facility] for further stabilization.       Goals of Care Treatment Preferences:  Code Status: Full Code      Consults:   Consults (From admission, onward)        Status Ordering Provider     Inpatient consult to Social Work/Case Management  Once        Provider:  (Not yet assigned)    CARLOS Graff          No new Assessment & Plan notes have been filed under this hospital service since the last note was generated.  Service: Hospital Medicine    Final Active Diagnoses:    Diagnosis Date Noted POA    PRINCIPAL PROBLEM:  Drug overdose, multiple drugs, undetermined intent, initial encounter [T50.914A] 05/13/2022 Yes    Suicidal ideation [R45.851] 05/13/2022 Not Applicable    Essential hypertension [I10] 05/13/2022 No    Smoker [F17.200] 10/29/2020 Yes     Chronic      Problems Resolved During this Admission:    Diagnosis Date Noted Date Resolved POA    Encephalopathy, toxic [G92.9] 05/13/2022 05/15/2022 Yes       Discharged Condition: stable    Disposition: Psychiatric Hospital    Follow Up:    Patient Instructions:   No discharge procedures on file.    Significant Diagnostic Studies: Labs: All labs within the past 24 hours have been reviewed    Pending Diagnostic Studies:     None         Medications:  Reconciled Home Medications:      Medication List      You have not been prescribed any medications.         Indwelling Lines/Drains at time of discharge:   Lines/Drains/Airways     None                 Time spent on the discharge of patient: 30 minutes         Josh Soria MD  Department of Hospital Medicine  '[place] - Med Surg

## 2023-11-22 ENCOUNTER — LAB VISIT (OUTPATIENT)
Dept: LAB | Facility: HOSPITAL | Age: 33
End: 2023-11-22
Attending: NURSE PRACTITIONER
Payer: MEDICAID

## 2023-11-22 ENCOUNTER — OFFICE VISIT (OUTPATIENT)
Dept: PRIMARY CARE CLINIC | Facility: CLINIC | Age: 33
End: 2023-11-22
Payer: MEDICAID

## 2023-11-22 VITALS
TEMPERATURE: 99 F | WEIGHT: 171.13 LBS | HEART RATE: 99 BPM | SYSTOLIC BLOOD PRESSURE: 116 MMHG | BODY MASS INDEX: 31.49 KG/M2 | HEIGHT: 62 IN | OXYGEN SATURATION: 99 % | DIASTOLIC BLOOD PRESSURE: 65 MMHG

## 2023-11-22 DIAGNOSIS — Z13.1 SCREENING FOR DIABETES MELLITUS: ICD-10-CM

## 2023-11-22 DIAGNOSIS — Z86.19 HISTORY OF HEPATITIS C: ICD-10-CM

## 2023-11-22 DIAGNOSIS — Z13.220 ENCOUNTER FOR LIPID SCREENING FOR CARDIOVASCULAR DISEASE: ICD-10-CM

## 2023-11-22 DIAGNOSIS — Z00.00 GENERAL MEDICAL EXAM: Primary | ICD-10-CM

## 2023-11-22 DIAGNOSIS — Z11.4 SCREENING FOR HIV (HUMAN IMMUNODEFICIENCY VIRUS): ICD-10-CM

## 2023-11-22 DIAGNOSIS — Z00.00 GENERAL MEDICAL EXAM: ICD-10-CM

## 2023-11-22 DIAGNOSIS — Z13.6 ENCOUNTER FOR LIPID SCREENING FOR CARDIOVASCULAR DISEASE: ICD-10-CM

## 2023-11-22 DIAGNOSIS — Z12.4 SCREENING FOR CERVICAL CANCER: ICD-10-CM

## 2023-11-22 DIAGNOSIS — Z11.59 ENCOUNTER FOR HEPATITIS C SCREENING TEST FOR LOW RISK PATIENT: ICD-10-CM

## 2023-11-22 LAB
ALBUMIN SERPL BCP-MCNC: 4.4 G/DL (ref 3.5–5.2)
ALP SERPL-CCNC: 40 U/L (ref 55–135)
ALT SERPL W/O P-5'-P-CCNC: 25 U/L (ref 10–44)
ANION GAP SERPL CALC-SCNC: 10 MMOL/L (ref 8–16)
AST SERPL-CCNC: 45 U/L (ref 10–40)
BASOPHILS # BLD AUTO: 0.01 K/UL (ref 0–0.2)
BASOPHILS NFR BLD: 0.2 % (ref 0–1.9)
BILIRUB SERPL-MCNC: 0.5 MG/DL (ref 0.1–1)
BUN SERPL-MCNC: 13 MG/DL (ref 6–20)
CALCIUM SERPL-MCNC: 9.6 MG/DL (ref 8.7–10.5)
CHLORIDE SERPL-SCNC: 109 MMOL/L (ref 95–110)
CHOLEST SERPL-MCNC: 214 MG/DL (ref 120–199)
CHOLEST/HDLC SERPL: 3.9 {RATIO} (ref 2–5)
CO2 SERPL-SCNC: 22 MMOL/L (ref 23–29)
CREAT SERPL-MCNC: 0.9 MG/DL (ref 0.5–1.4)
DIFFERENTIAL METHOD: ABNORMAL
EOSINOPHIL # BLD AUTO: 0.1 K/UL (ref 0–0.5)
EOSINOPHIL NFR BLD: 2.8 % (ref 0–8)
ERYTHROCYTE [DISTWIDTH] IN BLOOD BY AUTOMATED COUNT: 12.7 % (ref 11.5–14.5)
EST. GFR  (NO RACE VARIABLE): >60 ML/MIN/1.73 M^2
ESTIMATED AVG GLUCOSE: 88 MG/DL (ref 68–131)
GLUCOSE SERPL-MCNC: 62 MG/DL (ref 70–110)
HAV IGM SERPL QL IA: REACTIVE
HBA1C MFR BLD: 4.7 % (ref 4–5.6)
HBV CORE IGM SERPL QL IA: ABNORMAL
HBV SURFACE AG SERPL QL IA: ABNORMAL
HCT VFR BLD AUTO: 38.3 % (ref 37–48.5)
HCV AB SERPL QL IA: REACTIVE
HDLC SERPL-MCNC: 55 MG/DL (ref 40–75)
HDLC SERPL: 25.7 % (ref 20–50)
HGB BLD-MCNC: 12.5 G/DL (ref 12–16)
HIV 1+2 AB+HIV1 P24 AG SERPL QL IA: NORMAL
IMM GRANULOCYTES # BLD AUTO: 0.01 K/UL (ref 0–0.04)
IMM GRANULOCYTES NFR BLD AUTO: 0.2 % (ref 0–0.5)
LDLC SERPL CALC-MCNC: 141 MG/DL (ref 63–159)
LYMPHOCYTES # BLD AUTO: 1.5 K/UL (ref 1–4.8)
LYMPHOCYTES NFR BLD: 32 % (ref 18–48)
MCH RBC QN AUTO: 29 PG (ref 27–31)
MCHC RBC AUTO-ENTMCNC: 32.6 G/DL (ref 32–36)
MCV RBC AUTO: 89 FL (ref 82–98)
MONOCYTES # BLD AUTO: 0.2 K/UL (ref 0.3–1)
MONOCYTES NFR BLD: 4.9 % (ref 4–15)
NEUTROPHILS # BLD AUTO: 2.8 K/UL (ref 1.8–7.7)
NEUTROPHILS NFR BLD: 59.9 % (ref 38–73)
NONHDLC SERPL-MCNC: 159 MG/DL
NRBC BLD-RTO: 0 /100 WBC
PLATELET # BLD AUTO: 160 K/UL (ref 150–450)
PMV BLD AUTO: 11.2 FL (ref 9.2–12.9)
POTASSIUM SERPL-SCNC: 4.3 MMOL/L (ref 3.5–5.1)
PROT SERPL-MCNC: 8.1 G/DL (ref 6–8.4)
RBC # BLD AUTO: 4.31 M/UL (ref 4–5.4)
SODIUM SERPL-SCNC: 141 MMOL/L (ref 136–145)
T3FREE SERPL-MCNC: 2.9 PG/ML (ref 2.3–4.2)
T4 FREE SERPL-MCNC: 0.86 NG/DL (ref 0.71–1.51)
TRIGL SERPL-MCNC: 90 MG/DL (ref 30–150)
TSH SERPL DL<=0.005 MIU/L-ACNC: 0.77 UIU/ML (ref 0.4–4)
WBC # BLD AUTO: 4.72 K/UL (ref 3.9–12.7)

## 2023-11-22 PROCEDURE — 3008F BODY MASS INDEX DOCD: CPT | Mod: CPTII,,, | Performed by: NURSE PRACTITIONER

## 2023-11-22 PROCEDURE — 99999 PR PBB SHADOW E&M-EST. PATIENT-LVL III: ICD-10-PCS | Mod: PBBFAC,,, | Performed by: NURSE PRACTITIONER

## 2023-11-22 PROCEDURE — 1159F PR MEDICATION LIST DOCUMENTED IN MEDICAL RECORD: ICD-10-PCS | Mod: CPTII,,, | Performed by: NURSE PRACTITIONER

## 2023-11-22 PROCEDURE — 80061 LIPID PANEL: CPT | Performed by: NURSE PRACTITIONER

## 2023-11-22 PROCEDURE — 99999 PR PBB SHADOW E&M-EST. PATIENT-LVL III: CPT | Mod: PBBFAC,,, | Performed by: NURSE PRACTITIONER

## 2023-11-22 PROCEDURE — 99204 OFFICE O/P NEW MOD 45 MIN: CPT | Mod: S$PBB,,, | Performed by: NURSE PRACTITIONER

## 2023-11-22 PROCEDURE — 84439 ASSAY OF FREE THYROXINE: CPT | Performed by: NURSE PRACTITIONER

## 2023-11-22 PROCEDURE — 3074F PR MOST RECENT SYSTOLIC BLOOD PRESSURE < 130 MM HG: ICD-10-PCS | Mod: CPTII,,, | Performed by: NURSE PRACTITIONER

## 2023-11-22 PROCEDURE — 84443 ASSAY THYROID STIM HORMONE: CPT | Performed by: NURSE PRACTITIONER

## 2023-11-22 PROCEDURE — 3044F PR MOST RECENT HEMOGLOBIN A1C LEVEL <7.0%: ICD-10-PCS | Mod: CPTII,,, | Performed by: NURSE PRACTITIONER

## 2023-11-22 PROCEDURE — 87389 HIV-1 AG W/HIV-1&-2 AB AG IA: CPT | Performed by: NURSE PRACTITIONER

## 2023-11-22 PROCEDURE — 3078F PR MOST RECENT DIASTOLIC BLOOD PRESSURE < 80 MM HG: ICD-10-PCS | Mod: CPTII,,, | Performed by: NURSE PRACTITIONER

## 2023-11-22 PROCEDURE — 99213 OFFICE O/P EST LOW 20 MIN: CPT | Mod: PBBFAC,PN | Performed by: NURSE PRACTITIONER

## 2023-11-22 PROCEDURE — 80074 ACUTE HEPATITIS PANEL: CPT | Performed by: NURSE PRACTITIONER

## 2023-11-22 PROCEDURE — 3044F HG A1C LEVEL LT 7.0%: CPT | Mod: CPTII,,, | Performed by: NURSE PRACTITIONER

## 2023-11-22 PROCEDURE — 3078F DIAST BP <80 MM HG: CPT | Mod: CPTII,,, | Performed by: NURSE PRACTITIONER

## 2023-11-22 PROCEDURE — 84481 FREE ASSAY (FT-3): CPT | Performed by: NURSE PRACTITIONER

## 2023-11-22 PROCEDURE — 85025 COMPLETE CBC W/AUTO DIFF WBC: CPT | Performed by: NURSE PRACTITIONER

## 2023-11-22 PROCEDURE — 3074F SYST BP LT 130 MM HG: CPT | Mod: CPTII,,, | Performed by: NURSE PRACTITIONER

## 2023-11-22 PROCEDURE — 83036 HEMOGLOBIN GLYCOSYLATED A1C: CPT | Performed by: NURSE PRACTITIONER

## 2023-11-22 PROCEDURE — 99204 PR OFFICE/OUTPT VISIT, NEW, LEVL IV, 45-59 MIN: ICD-10-PCS | Mod: S$PBB,,, | Performed by: NURSE PRACTITIONER

## 2023-11-22 PROCEDURE — 36415 COLL VENOUS BLD VENIPUNCTURE: CPT | Mod: PN | Performed by: NURSE PRACTITIONER

## 2023-11-22 PROCEDURE — 1159F MED LIST DOCD IN RCRD: CPT | Mod: CPTII,,, | Performed by: NURSE PRACTITIONER

## 2023-11-22 PROCEDURE — 1160F PR REVIEW ALL MEDS BY PRESCRIBER/CLIN PHARMACIST DOCUMENTED: ICD-10-PCS | Mod: CPTII,,, | Performed by: NURSE PRACTITIONER

## 2023-11-22 PROCEDURE — 87522 HEPATITIS C REVRS TRNSCRPJ: CPT | Performed by: NURSE PRACTITIONER

## 2023-11-22 PROCEDURE — 1160F RVW MEDS BY RX/DR IN RCRD: CPT | Mod: CPTII,,, | Performed by: NURSE PRACTITIONER

## 2023-11-22 PROCEDURE — 80053 COMPREHEN METABOLIC PANEL: CPT | Performed by: NURSE PRACTITIONER

## 2023-11-22 PROCEDURE — 3008F PR BODY MASS INDEX (BMI) DOCUMENTED: ICD-10-PCS | Mod: CPTII,,, | Performed by: NURSE PRACTITIONER

## 2023-11-22 NOTE — PROGRESS NOTES
Subjective:       Patient ID: Milagros Swan is a 33 y.o. female.    Chief Complaint:     History of Present Illness:   Milagros Swan 33 y.o. female presents today to address care gaps and establish care. Denies any other problems or concerns at this time. Treatment options and alternatives were discussed with the patient. Patient provided opportunity to ask additional questions.  All questions were answered. Voices understanding and acceptance of this advice. Instructed to call back if any further questions or concerns.     Past Medical History:   Diagnosis Date    Hepatitis C     Hypertension 5/13/2022    Substance abuse      Family History   Problem Relation Age of Onset    Diabetes Mother     Diabetes Father     Cancer Maternal Grandmother     Cancer Maternal Grandfather      Social History     Socioeconomic History    Marital status: Single   Tobacco Use    Smoking status: Every Day     Current packs/day: 0.50     Average packs/day: 0.5 packs/day for 5.0 years (2.5 ttl pk-yrs)     Types: Cigarettes    Smokeless tobacco: Current   Substance and Sexual Activity    Alcohol use: Not Currently    Drug use: Yes     Types: IV, Methamphetamines, Marijuana     Comment: Reports crystal meth use 10/29    Sexual activity: Yes     No outpatient encounter medications on file as of 11/22/2023.     No facility-administered encounter medications on file as of 11/22/2023.       Review of Systems   Constitutional:  Negative for appetite change, chills and fever.   HENT:  Negative for ear pain, sinus pressure, sore throat and trouble swallowing.    Eyes:  Negative for visual disturbance.   Respiratory:  Negative for shortness of breath.    Cardiovascular:  Negative for chest pain.   Gastrointestinal:  Negative for abdominal pain, diarrhea, nausea and vomiting.   Endocrine: Negative for cold intolerance, polyphagia and polyuria.   Genitourinary:  Negative for decreased urine volume and dysuria.   Musculoskeletal:   "Negative for back pain.   Skin:  Negative for rash.   Allergic/Immunologic: Negative for environmental allergies and food allergies.   Neurological:  Negative for dizziness, tremors, weakness and numbness.   Hematological:  Does not bruise/bleed easily.   Psychiatric/Behavioral:  Negative for confusion and hallucinations. The patient is not nervous/anxious and is not hyperactive.    All other systems reviewed and are negative.      Objective:      /65 (BP Location: Left arm, Patient Position: Sitting, BP Method: Medium (Manual))   Pulse 99   Temp 99.1 °F (37.3 °C) (Oral)   Ht 5' 2" (1.575 m)   Wt 77.6 kg (171 lb 1.6 oz)   SpO2 99%   BMI 31.29 kg/m²   Physical Exam  Constitutional:       Appearance: She is well-developed. She is obese.   HENT:      Head: Normocephalic.      Nose: Nose normal.   Eyes:      Pupils: Pupils are equal, round, and reactive to light.   Cardiovascular:      Rate and Rhythm: Normal rate.      Heart sounds: Normal heart sounds.   Pulmonary:      Effort: Pulmonary effort is normal.      Breath sounds: Normal breath sounds.   Abdominal:      General: Bowel sounds are normal.      Palpations: Abdomen is soft.   Musculoskeletal:         General: Normal range of motion.      Cervical back: Normal range of motion.   Skin:     General: Skin is warm and dry.   Neurological:      Mental Status: She is alert and oriented to person, place, and time.   Psychiatric:         Behavior: Behavior normal.         Results for orders placed or performed during the hospital encounter of 05/13/22   CBC auto differential   Result Value Ref Range    WBC 4.61 3.90 - 12.70 K/uL    RBC 4.21 4.00 - 5.40 M/uL    Hemoglobin 11.9 (L) 12.0 - 16.0 g/dL    Hematocrit 35.9 (L) 37.0 - 48.5 %    MCV 85 82 - 98 fL    MCH 28.3 27.0 - 31.0 pg    MCHC 33.1 32.0 - 36.0 g/dL    RDW 12.9 11.5 - 14.5 %    Platelets 211 150 - 450 K/uL    MPV 9.7 9.2 - 12.9 fL    Immature Granulocytes 0.2 0.0 - 0.5 %    Gran # (ANC) 2.3 1.8 - " 7.7 K/uL    Immature Grans (Abs) 0.01 0.00 - 0.04 K/uL    Lymph # 1.8 1.0 - 4.8 K/uL    Mono # 0.4 0.3 - 1.0 K/uL    Eos # 0.1 0.0 - 0.5 K/uL    Baso # 0.00 0.00 - 0.20 K/uL    nRBC 0 0 /100 WBC    Gran % 49.9 38.0 - 73.0 %    Lymph % 39.9 18.0 - 48.0 %    Mono % 8.0 4.0 - 15.0 %    Eosinophil % 2.0 0.0 - 8.0 %    Basophil % 0.0 0.0 - 1.9 %    Differential Method Automated    Comprehensive metabolic panel   Result Value Ref Range    Sodium 142 136 - 145 mmol/L    Potassium 3.7 3.5 - 5.1 mmol/L    Chloride 108 95 - 110 mmol/L    CO2 24 23 - 29 mmol/L    Glucose 91 70 - 110 mg/dL    BUN 16 6 - 20 mg/dL    Creatinine 0.7 0.5 - 1.4 mg/dL    Calcium 9.4 8.7 - 10.5 mg/dL    Total Protein 7.8 6.0 - 8.4 g/dL    Albumin 3.9 3.5 - 5.2 g/dL    Total Bilirubin 0.7 0.1 - 1.0 mg/dL    Alkaline Phosphatase 71 55 - 135 U/L    AST 56 (H) 10 - 40 U/L    ALT 64 (H) 10 - 44 U/L    Anion Gap 10 8 - 16 mmol/L    eGFR if African American >60 >60 mL/min/1.73 m^2    eGFR if non African American >60 >60 mL/min/1.73 m^2   Ammonia   Result Value Ref Range    Ammonia 25 10 - 50 umol/L   Protime-INR   Result Value Ref Range    Prothrombin Time 11.4 9.0 - 12.5 sec    INR 1.1 0.8 - 1.2   Ethanol   Result Value Ref Range    Alcohol, Serum <10 <10 mg/dL   Urinalysis, Reflex to Urine Culture Urine, Clean Catch    Specimen: Urine   Result Value Ref Range    Specimen UA Urine, Catheterized     Color, UA Yellow Yellow, Straw, Reny    Appearance, UA Clear Clear    pH, UA 6.0 5.0 - 8.0    Specific Gravity, UA 1.025 1.005 - 1.030    Protein, UA Negative Negative    Glucose, UA Negative Negative    Ketones, UA 1+ (A) Negative    Bilirubin (UA) Negative Negative    Occult Blood UA Negative Negative    Nitrite, UA Negative Negative    Urobilinogen, UA 2.0-3.0 (A) <2.0 EU/dL    Leukocytes, UA Negative Negative   Drug screen panel, emergency   Result Value Ref Range    Benzodiazepines Presumptive Positive (A) Negative    Methadone metabolites Negative Negative     Cocaine (Metab.) Presumptive Positive (A) Negative    Opiate Scrn, Ur Negative Negative    Barbiturate Screen, Ur Negative Negative    Amphetamine Screen, Ur Presumptive Positive (A) Negative    THC Presumptive Positive (A) Negative    Phencyclidine Negative Negative    Creatinine, Urine 220.9 15.0 - 325.0 mg/dL    Toxicology Information SEE COMMENT    Pregnancy, urine rapid   Result Value Ref Range    Preg Test, Ur Negative    CPK   Result Value Ref Range     (H) 20 - 180 U/L   COVID-19 Rapid Screening   Result Value Ref Range    SARS-CoV-2 RNA, Amplification, Qual Negative Negative   Basic metabolic panel   Result Value Ref Range    Sodium 141 136 - 145 mmol/L    Potassium 4.3 3.5 - 5.1 mmol/L    Chloride 113 (H) 95 - 110 mmol/L    CO2 14 (L) 23 - 29 mmol/L    Glucose 73 70 - 110 mg/dL    BUN 11 6 - 20 mg/dL    Creatinine 0.6 0.5 - 1.4 mg/dL    Calcium 8.7 8.7 - 10.5 mg/dL    Anion Gap 14 8 - 16 mmol/L    eGFR if African American >60 >60 mL/min/1.73 m^2    eGFR if non African American >60 >60 mL/min/1.73 m^2   CK   Result Value Ref Range     (H) 20 - 180 U/L   Basic Metabolic Panel   Result Value Ref Range    Sodium 140 136 - 145 mmol/L    Potassium 3.6 3.5 - 5.1 mmol/L    Chloride 110 95 - 110 mmol/L    CO2 17 (L) 23 - 29 mmol/L    Glucose 63 (L) 70 - 110 mg/dL    BUN 12 6 - 20 mg/dL    Creatinine 0.6 0.5 - 1.4 mg/dL    Calcium 8.4 (L) 8.7 - 10.5 mg/dL    Anion Gap 13 8 - 16 mmol/L    eGFR if African American >60 >60 mL/min/1.73 m^2    eGFR if non African American >60 >60 mL/min/1.73 m^2     Assessment:       1. General medical exam    2. Screening for HIV (human immunodeficiency virus)    3. Encounter for lipid screening for cardiovascular disease    4. Screening for diabetes mellitus    5. Encounter for hepatitis C screening test for low risk patient    6. History of hepatitis C    7. Screening for cervical cancer        Plan:   Diagnoses and all orders for this visit:    General medical  exam  -     CBC Auto Differential; Future  -     Comprehensive Metabolic Panel; Future    Screening for HIV (human immunodeficiency virus)  -     HIV 1/2 Ag/Ab (4th Gen); Future    Encounter for lipid screening for cardiovascular disease  -     Lipid Panel; Future    Screening for diabetes mellitus  -     Hemoglobin A1C; Future  -     TSH; Future  -     T3, Free; Future  -     T4, Free; Future    Encounter for hepatitis C screening test for low risk patient    History of hepatitis C  -     Ambulatory referral/consult to Gynecology; Future  -     Hepatitis C RNA, Quantitative, PCR; Future  -     Hepatitis Panel, Acute; Future    Screening for cervical cancer              [facility]   [address] [place] [address] [address]  [place] [address]  Office [phone]  Fax [phone]

## 2023-11-24 LAB
HCV RNA SERPL QL NAA+PROBE: NOT DETECTED
HCV RNA SPEC NAA+PROBE-ACNC: NOT DETECTED IU/ML

## 2024-02-12 ENCOUNTER — OFFICE VISIT (OUTPATIENT)
Dept: OBSTETRICS AND GYNECOLOGY | Facility: CLINIC | Age: 34
End: 2024-02-12

## 2024-02-12 VITALS — BODY MASS INDEX: 27.57 KG/M2 | WEIGHT: 149.81 LBS | HEIGHT: 62 IN

## 2024-02-12 DIAGNOSIS — Z12.4 CERVICAL CANCER SCREENING: ICD-10-CM

## 2024-02-12 DIAGNOSIS — Z01.419 ENCOUNTER FOR ANNUAL ROUTINE GYNECOLOGICAL EXAMINATION: Primary | ICD-10-CM

## 2024-02-12 DIAGNOSIS — N92.0 MENORRHAGIA WITH REGULAR CYCLE: ICD-10-CM

## 2024-02-12 DIAGNOSIS — Z30.018 ENCOUNTER FOR PRESCRIPTION FOR NUVARING: ICD-10-CM

## 2024-02-12 LAB
B-HCG UR QL: NEGATIVE
CTP QC/QA: YES

## 2024-02-12 PROCEDURE — 88142 CYTOPATH C/V THIN LAYER: CPT

## 2024-02-12 PROCEDURE — 99999PBSHW PR PBB SHADOW TECHNICAL ONLY FILED TO HB: Mod: PBBFAC,,,

## 2024-02-12 PROCEDURE — 99213 OFFICE O/P EST LOW 20 MIN: CPT | Mod: PBBFAC,PN

## 2024-02-12 PROCEDURE — 99385 PREV VISIT NEW AGE 18-39: CPT | Mod: S$PBB,,,

## 2024-02-12 PROCEDURE — 99999 PR PBB SHADOW E&M-EST. PATIENT-LVL III: CPT | Mod: PBBFAC,,,

## 2024-02-12 PROCEDURE — 87624 HPV HI-RISK TYP POOLED RSLT: CPT

## 2024-02-12 PROCEDURE — 99999PBSHW POCT URINE PREGNANCY: Mod: PBBFAC,,,

## 2024-02-12 PROCEDURE — 81025 URINE PREGNANCY TEST: CPT | Mod: PBBFAC,PN

## 2024-02-12 RX ORDER — ETONOGESTREL AND ETHINYL ESTRADIOL VAGINAL RING .015; .12 MG/D; MG/D
1 RING VAGINAL
Qty: 1 EACH | Refills: 11 | Status: SHIPPED | OUTPATIENT
Start: 2024-02-12 | End: 2025-02-11

## 2024-02-12 NOTE — PROGRESS NOTES
Subjective:       Patient ID: Milagros Swan is a 33 y.o. female.    Chief Complaint:  Well Woman      History of Present Illness  Gynecologic Exam  The patient's pertinent negatives include no genital itching, genital lesions, genital odor, genital rash, missed menses, pelvic pain, vaginal bleeding or vaginal discharge. Her past medical history is significant for a  section, menorrhagia and ovarian cysts. There is no history of an abdominal surgery, an ectopic pregnancy, endometriosis, a gynecological surgery, herpes simplex, metrorrhagia or miscarriage.     Annual Exam-Premenopausal  Patient presents for annual exam. The patient has complaints today of heavy menstrual cycles. The patient is sexually active. GYN screening history: last pap: was normal and patient does not recall when last pap was. Reports normal pap history. The patient wears seatbelts: yes. The patient participates in regular exercise: yes. Has the patient ever been transfused or tattooed?: yes. The patient reports that there is not domestic violence in her life.      Menses are q28-30 days, with periods lasting 7 days. First 4 days heavy bleeding with clots, wearing pads changing q 45min- 1hr.   Reports she's used OCPs in the past to help with bleeding but discontinued because of weight gain.       History of IV drug abuse, she is now 18 months sober and doing well, works at [employer] for the [employer] department.    GYN & OB History  Patient's last menstrual period was 2024.   Date of Last Pap: 2024    OB History    Para Term  AB Living   3 1 1     4   SAB IAB Ectopic Multiple Live Births         1 4      # Outcome Date GA Lbr Jack/2nd Weight Sex Delivery Anes PTL Lv   3A Term            3B Term            2             1                 Review of Systems  Review of Systems   Constitutional: Negative.    HENT: Negative.     Eyes: Negative.    Respiratory: Negative.     Cardiovascular: Negative.     Gastrointestinal: Negative.    Endocrine: Negative.    Genitourinary:  Positive for menorrhagia and menstrual problem. Negative for missed menses, pelvic pain and vaginal discharge.   Musculoskeletal: Negative.    Integumentary:  Negative.   Neurological: Negative.    Hematological: Negative.    Psychiatric/Behavioral: Negative.     Breast: Positive for breast self exam.          Objective:      Physical Exam:   Constitutional: She is oriented to person, place, and time. She appears well-developed and well-nourished.    HENT:   Head: Normocephalic and atraumatic.   Nose: Nose normal.    Eyes: Pupils are equal, round, and reactive to light. Conjunctivae and EOM are normal.     Cardiovascular:  Normal rate and regular rhythm.             Pulmonary/Chest: Effort normal. She has no decreased breath sounds. She has no rhonchi. Right breast exhibits no inverted nipple, no mass, no nipple discharge, no tenderness and no bleeding. Left breast exhibits no inverted nipple, no mass, no nipple discharge, no tenderness and no bleeding. Breasts are symmetrical.        Abdominal: Soft. There is no abdominal tenderness.     Genitourinary:    Inguinal canal, vagina, uterus, right adnexa, left adnexa and rectum normal.      Pelvic exam was performed with patient in the lithotomy position.   The external female genitalia was normal.   No external genitalia lesions identified,Genitalia hair distrobution normal .     Labial bartholins normal.Cervix is normal. Right adnexum displays no mass and no tenderness. Left adnexum displays no mass and no tenderness. No vaginal discharge, tenderness or bleeding in the vagina. Vagina was moist.Cervix exhibits no motion tenderness, no discharge and no tenderness.    pap smear completedUerus contour normal  Uterus is not tender. Uterus size: 8 cm.Normal urethral meatus.          Musculoskeletal: Normal range of motion and moves all extremeties.       Neurological: She is alert and oriented to  person, place, and time.    Skin: Skin is warm and dry.    Psychiatric: She has a normal mood and affect. Her speech is normal and behavior is normal. Mood, judgment and thought content normal.             Assessment:        1. Encounter for annual routine gynecological examination    2. Cervical cancer screening    3. Menorrhagia with regular cycle    4. Encounter for prescription for nuvaring               Plan:   Continue annual well woman exam.    Diagnosis and orders this visit:  Encounter for annual routine gynecological examination  -     Ambulatory referral/consult to Gynecology  - Pap collected. Patient was counseled today on ASCCP screening guidelines (pap smear every 3 years in low risk patients) and recommendations for annual pelvic exams and clinical breast exams, yearly mammograms starting at age 40; and to see her PCP for other healthcare maintenance.      Cervical cancer screening  -     Liquid-Based Pap Smear, Screening  -     HPV High Risk Genotypes, PCR    Menorrhagia with regular cycle  -     POCT urine pregnancy   -Treatment options for menorrhagia were discussed with the patient including OCP's, Depo Provera, Lysteda, Mirena IUD, NuvaRIng, endometrial ablation, and hysterectomy. Patient decided NuvaRIng.    Encounter for prescription for nuvaring  -     etonogestreL-ethinyl estradioL (NUVARING) 0.12-0.015 mg/24 hr vaginal ring; Place 1 each vaginally every 21 days. Insert one (1) ring vaginally and leave in place for three (3) weeks, then remove for one (1) week.  Dispense: 1 each; Refill: 11  - The use of the nuvaring has been fully discussed with the patient. This includes the proper method to initiate and continue nuvaring, the need for regular compliance to ensure adequate contraceptive effect, the physiology which make the pill effective, the instructions for what to do in event of nuvaring falling out, and warnings about anticipated minor side effects such as breakthrough spotting,  nausea, breast tenderness, weight changes, acne, headaches, etc.  She has been told of the more serious potential side effects such as MI, stroke, and deep vein thrombosis, all of which are very unlikely.  She has been asked to report any signs of such serious problems immediately.   The need for additional protection, such as a condom, to prevent exposure to sexually transmitted diseases has also been discussed- the patient has been clearly reminded that nuvaring cannot protect her against diseases such as HIV and others. She understands and wishes to take the medication as prescribed.  - Instructed her to insert nuvaring on Sunday after menstruation starts, keep in place x3 weeks; remove x1 week then insert a new one the following week.  May experience vaginal discharge, HAs, or irregular bleeding in the first three months.      Pauline Ellis, NP

## 2024-02-16 LAB
FINAL PATHOLOGIC DIAGNOSIS: NORMAL
Lab: NORMAL

## 2024-02-24 LAB
HPV HR 12 DNA SPEC QL NAA+PROBE: NEGATIVE
HPV16 AG SPEC QL: NEGATIVE
HPV18 DNA SPEC QL NAA+PROBE: NEGATIVE

## 2024-04-12 ENCOUNTER — PATIENT MESSAGE (OUTPATIENT)
Dept: ADMINISTRATIVE | Facility: HOSPITAL | Age: 34
End: 2024-04-12

## 2024-07-09 ENCOUNTER — PATIENT MESSAGE (OUTPATIENT)
Dept: ADMINISTRATIVE | Facility: HOSPITAL | Age: 34
End: 2024-07-09

## 2024-12-04 DIAGNOSIS — I10 ESSENTIAL HYPERTENSION: ICD-10-CM

## 2025-03-10 NOTE — ASSESSMENT & PLAN NOTE
-Current 0.5 pack/day smoker, smoking for 5 years  -Cessation counseling offered     DISPLAY PLAN FREE TEXT